# Patient Record
Sex: FEMALE | Race: WHITE | Employment: UNEMPLOYED | URBAN - METROPOLITAN AREA
[De-identification: names, ages, dates, MRNs, and addresses within clinical notes are randomized per-mention and may not be internally consistent; named-entity substitution may affect disease eponyms.]

---

## 2017-04-04 ENCOUNTER — OFFICE VISIT (OUTPATIENT)
Dept: DERMATOLOGY CLINIC | Facility: CLINIC | Age: 52
End: 2017-04-04

## 2017-04-04 DIAGNOSIS — L91.0 KELOID SCAR: ICD-10-CM

## 2017-04-04 DIAGNOSIS — Z85.828 PERSONAL HISTORY OF OTHER MALIGNANT NEOPLASM OF SKIN: Primary | ICD-10-CM

## 2017-04-04 DIAGNOSIS — D23.30 BENIGN NEOPLASM OF SKIN OF FACE: ICD-10-CM

## 2017-04-04 DIAGNOSIS — D23.4 BENIGN NEOPLASM OF SCALP AND SKIN OF NECK: ICD-10-CM

## 2017-04-04 DIAGNOSIS — L81.4 SOLAR LENTIGO: ICD-10-CM

## 2017-04-04 DIAGNOSIS — L57.0 ACTINIC KERATOSIS: ICD-10-CM

## 2017-04-04 DIAGNOSIS — Z86.018 HISTORY OF DYSPLASTIC NEVUS: ICD-10-CM

## 2017-04-04 DIAGNOSIS — D23.60 BENIGN NEOPLASM OF SKIN OF UPPER LIMB, INCLUDING SHOULDER, UNSPECIFIED LATERALITY: ICD-10-CM

## 2017-04-04 DIAGNOSIS — D23.5 BENIGN NEOPLASM OF SKIN OF TRUNK, EXCEPT SCROTUM: ICD-10-CM

## 2017-04-04 DIAGNOSIS — L57.8 SUN-DAMAGED SKIN: ICD-10-CM

## 2017-04-04 PROCEDURE — 99213 OFFICE O/P EST LOW 20 MIN: CPT | Performed by: DERMATOLOGY

## 2017-04-04 PROCEDURE — 17000 DESTRUCT PREMALG LESION: CPT | Performed by: DERMATOLOGY

## 2017-04-04 PROCEDURE — 17003 DESTRUCT PREMALG LES 2-14: CPT | Performed by: DERMATOLOGY

## 2017-04-04 RX ORDER — LOTEPREDNOL ETABONATE AND TOBRAMYCIN 5; 3 MG/ML; MG/ML
SUSPENSION/ DROPS OPHTHALMIC
Refills: 0 | COMMUNITY
Start: 2017-02-24 | End: 2017-10-19

## 2017-04-04 NOTE — PROGRESS NOTES
HPI:     Chief Complaint     Derm Problem        HPI     Derm Problem    Additional comments: lov 9/8/2016. Patient presents for upper body check. Personal hx of SCC and atypical nevus.         Last edited by TEMI Presley on 4/4/2017  2:54 PM. (Histor 2010    UPPER GI ENDOSCOPY,BIOPSY  2010    Comment EGD with biopsy       Social History   Marital Status:   Spouse Name: N/A    Years of Education: N/A  Number of Children: N/A     Occupational History  None on file     Social History Main Topics also keloid and spot of shave biopsy in the chest that did reveal a verrucoid irritated keratosis        ASSESSMENT/PLAN:   Actinic keratosis-3 lesions chest and arm are treated with cryotherapy.   Personal history of other malignant neoplasm of skin  (prim

## 2017-04-26 ENCOUNTER — OFFICE VISIT (OUTPATIENT)
Dept: INTERNAL MEDICINE CLINIC | Facility: CLINIC | Age: 52
End: 2017-04-26

## 2017-04-26 VITALS
WEIGHT: 185 LBS | BODY MASS INDEX: 29.38 KG/M2 | SYSTOLIC BLOOD PRESSURE: 101 MMHG | HEIGHT: 66.5 IN | HEART RATE: 66 BPM | TEMPERATURE: 98 F | DIASTOLIC BLOOD PRESSURE: 69 MMHG

## 2017-04-26 DIAGNOSIS — H04.301 TEAR DUCT INFECTION, RIGHT: Primary | ICD-10-CM

## 2017-04-26 PROCEDURE — 99213 OFFICE O/P EST LOW 20 MIN: CPT | Performed by: INTERNAL MEDICINE

## 2017-04-26 PROCEDURE — 99212 OFFICE O/P EST SF 10 MIN: CPT | Performed by: INTERNAL MEDICINE

## 2017-04-26 NOTE — PROGRESS NOTES
Eye pain x 5 weeks  Saw optometry given a dop no improvement  Eye is red in the end of the day pt is applying Visine    Blood pressure 101/69, pulse 66, temperature 98.1 °F (36.7 °C), temperature source Oral, height 5' 6.5\" (1.689 m), weight 185 lb (83.91

## 2017-10-19 ENCOUNTER — OFFICE VISIT (OUTPATIENT)
Dept: INTERNAL MEDICINE CLINIC | Facility: CLINIC | Age: 52
End: 2017-10-19

## 2017-10-19 ENCOUNTER — APPOINTMENT (OUTPATIENT)
Dept: LAB | Age: 52
End: 2017-10-19
Attending: INTERNAL MEDICINE
Payer: COMMERCIAL

## 2017-10-19 VITALS
HEART RATE: 64 BPM | DIASTOLIC BLOOD PRESSURE: 74 MMHG | TEMPERATURE: 98 F | HEIGHT: 66.5 IN | WEIGHT: 184 LBS | SYSTOLIC BLOOD PRESSURE: 108 MMHG | BODY MASS INDEX: 29.22 KG/M2

## 2017-10-19 DIAGNOSIS — Z12.4 SCREENING FOR CERVICAL CANCER: ICD-10-CM

## 2017-10-19 DIAGNOSIS — Z00.00 ROUTINE GENERAL MEDICAL EXAMINATION AT A HEALTH CARE FACILITY: ICD-10-CM

## 2017-10-19 DIAGNOSIS — Z12.31 VISIT FOR SCREENING MAMMOGRAM: ICD-10-CM

## 2017-10-19 DIAGNOSIS — Z00.00 ROUTINE GENERAL MEDICAL EXAMINATION AT A HEALTH CARE FACILITY: Primary | ICD-10-CM

## 2017-10-19 PROCEDURE — 85027 COMPLETE CBC AUTOMATED: CPT

## 2017-10-19 PROCEDURE — 80053 COMPREHEN METABOLIC PANEL: CPT

## 2017-10-19 PROCEDURE — 84443 ASSAY THYROID STIM HORMONE: CPT

## 2017-10-19 PROCEDURE — 93010 ELECTROCARDIOGRAM REPORT: CPT | Performed by: INTERNAL MEDICINE

## 2017-10-19 PROCEDURE — 36415 COLL VENOUS BLD VENIPUNCTURE: CPT

## 2017-10-19 PROCEDURE — 93005 ELECTROCARDIOGRAM TRACING: CPT

## 2017-10-19 PROCEDURE — 80061 LIPID PANEL: CPT

## 2017-10-19 PROCEDURE — 81015 MICROSCOPIC EXAM OF URINE: CPT

## 2017-10-19 PROCEDURE — 99396 PREV VISIT EST AGE 40-64: CPT | Performed by: INTERNAL MEDICINE

## 2017-10-19 PROCEDURE — 84439 ASSAY OF FREE THYROXINE: CPT

## 2017-10-27 NOTE — PROGRESS NOTES
HPI:    Patient ID: Cornelia Ambrosio is a 46year old female. HPI    Review of Systems   Constitutional: Negative for activity change, chills, fatigue and fever.    HENT: Negative for ear discharge, nosebleeds, postnasal drip, rhinorrhea, sinus pressure and Relation Age of Onset   • Breast Cancer Mother    • Colon Cancer Maternal Grandmother      Rectal cancer   • Stroke Maternal Grandfather      CVA   • Hypertension Paternal Grandmother    • Neurological Disorder Paternal Grandmother      Alzheimer's disease Neurological: She is alert. Skin: No rash noted. She is not diaphoretic. No erythema. Nursing note and vitals reviewed.            ASSESSMENT/PLAN:     Orders Placed This Encounter      Hpv Dna  High Risk , Thin Prep Collect      Assay, Thyroid Stim H

## 2017-12-07 ENCOUNTER — OFFICE VISIT (OUTPATIENT)
Dept: DERMATOLOGY CLINIC | Facility: CLINIC | Age: 52
End: 2017-12-07

## 2017-12-07 DIAGNOSIS — D23.70 BENIGN NEOPLASM OF SKIN OF LOWER LIMB, INCLUDING HIP, UNSPECIFIED LATERALITY: ICD-10-CM

## 2017-12-07 DIAGNOSIS — Z86.018 HISTORY OF DYSPLASTIC NEVUS: ICD-10-CM

## 2017-12-07 DIAGNOSIS — D23.60 BENIGN NEOPLASM OF SKIN OF UPPER LIMB, INCLUDING SHOULDER, UNSPECIFIED LATERALITY: ICD-10-CM

## 2017-12-07 DIAGNOSIS — D23.30 BENIGN NEOPLASM OF SKIN OF FACE: ICD-10-CM

## 2017-12-07 DIAGNOSIS — D23.5 BENIGN NEOPLASM OF SKIN OF TRUNK, EXCEPT SCROTUM: ICD-10-CM

## 2017-12-07 DIAGNOSIS — L57.8 SUN-DAMAGED SKIN: ICD-10-CM

## 2017-12-07 DIAGNOSIS — D48.5 NEOPLASM OF UNCERTAIN BEHAVIOR OF SKIN: ICD-10-CM

## 2017-12-07 DIAGNOSIS — L91.0 KELOID SCAR: ICD-10-CM

## 2017-12-07 DIAGNOSIS — Z85.828 PERSONAL HISTORY OF OTHER MALIGNANT NEOPLASM OF SKIN: Primary | ICD-10-CM

## 2017-12-07 DIAGNOSIS — D23.4 BENIGN NEOPLASM OF SCALP AND SKIN OF NECK: ICD-10-CM

## 2017-12-07 PROCEDURE — 99212 OFFICE O/P EST SF 10 MIN: CPT | Performed by: DERMATOLOGY

## 2017-12-07 PROCEDURE — 99213 OFFICE O/P EST LOW 20 MIN: CPT | Performed by: DERMATOLOGY

## 2017-12-07 RX ORDER — A/SINGAPORE/GP1908/2015 IVR-180 (H1N1) (AN A/MICHIGAN/45/2015-LIKE VIRUS), A/SINGAPORE/GP2050/2015 (H3N2) (AN A/HONG KONG/4801/2014 - LIKE VIRUS), B/UTAH/9/2014 (A B/PHUKET/3073/2013-LIKE VIRUS), B/HONG KONG/259/2010 (A B/BRISBANE/60/08-LIKE VIRUS) 15; 15; 15; 15 UG/.5ML; UG/.5ML; UG/.5ML; UG/.5ML
INJECTION, SUSPENSION INTRAMUSCULAR
Refills: 0 | COMMUNITY
Start: 2017-09-21 | End: 2018-05-29

## 2017-12-07 NOTE — PROGRESS NOTES
Past Medical History:   Diagnosis Date   • Anemia, iron deficiency    • Atypical nevus 2001    skin of left medial ankle   • Gastritis    • Hx of hand surgery     Right hand surgery   • MGD (meibomian gland dysfunction) 2009    OU   • Myopia of both eyes w

## 2017-12-07 NOTE — PROGRESS NOTES
HPI:     Chief Complaint     Skin Cancer        HPI     Skin Cancer    Additional comments: Pt presents for 8 month f/u due to hx of SCC and atypical nevus.  Pt presents today with concern of multiple lesions throughout body and requests a full body skin ev (meibomian gland dysfunction) 2009    OU   • Myopia of both eyes with astigmatism 2009    OU   • Ocular migraine 2009    OS   • SCCA (squamous cell carcinoma) of skin 2015 2015   • Seasonal allergies    • Squamous carcinoma 2015    left forearm, invasiv uniform in color, shape and borders.   There are very numerous in number  -there are very diffuse scattered blotchy brown macules on face, trunk and extremities  - there is a keloid/hypertrophic scar on right chest  - there are some cherry red papules  - th

## 2017-12-11 ENCOUNTER — HOSPITAL ENCOUNTER (OUTPATIENT)
Dept: MAMMOGRAPHY | Facility: HOSPITAL | Age: 52
Discharge: HOME | End: 2017-12-11
Attending: INTERNAL MEDICINE
Payer: COMMERCIAL

## 2017-12-11 DIAGNOSIS — Z00.00 ROUTINE GENERAL MEDICAL EXAMINATION AT A HEALTH CARE FACILITY: ICD-10-CM

## 2017-12-11 DIAGNOSIS — Z12.31 VISIT FOR SCREENING MAMMOGRAM: ICD-10-CM

## 2017-12-11 PROCEDURE — 77067 SCR MAMMO BI INCL CAD: CPT | Performed by: INTERNAL MEDICINE

## 2018-01-16 ENCOUNTER — TELEPHONE (OUTPATIENT)
Dept: GASTROENTEROLOGY | Facility: CLINIC | Age: 53
End: 2018-01-16

## 2018-01-16 ENCOUNTER — OFFICE VISIT (OUTPATIENT)
Dept: GASTROENTEROLOGY | Facility: CLINIC | Age: 53
End: 2018-01-16

## 2018-01-16 VITALS
SYSTOLIC BLOOD PRESSURE: 115 MMHG | DIASTOLIC BLOOD PRESSURE: 72 MMHG | BODY MASS INDEX: 29.6 KG/M2 | HEART RATE: 67 BPM | HEIGHT: 66.5 IN | WEIGHT: 186.38 LBS

## 2018-01-16 DIAGNOSIS — Z80.0 FAMILY HISTORY OF COLON CANCER: ICD-10-CM

## 2018-01-16 DIAGNOSIS — Z12.11 COLON CANCER SCREENING: Primary | ICD-10-CM

## 2018-01-16 DIAGNOSIS — Z83.71 FAMILY HISTORY OF COLONIC POLYPS: ICD-10-CM

## 2018-01-16 DIAGNOSIS — Z12.12 ENCOUNTER FOR COLORECTAL CANCER SCREENING: Primary | ICD-10-CM

## 2018-01-16 DIAGNOSIS — Z12.11 ENCOUNTER FOR COLORECTAL CANCER SCREENING: Primary | ICD-10-CM

## 2018-01-16 DIAGNOSIS — Z01.818 PREOPERATIVE CLEARANCE: ICD-10-CM

## 2018-01-16 PROBLEM — Z83.719 FAMILY HISTORY OF COLONIC POLYPS: Status: ACTIVE | Noted: 2018-01-16

## 2018-01-16 PROCEDURE — 99243 OFF/OP CNSLTJ NEW/EST LOW 30: CPT | Performed by: INTERNAL MEDICINE

## 2018-01-16 NOTE — H&P
History of present illness: This is a very pleasant 14-year-old female patient of Dr. Rosalinda Murillo who is referred for colorectal cancer screening.   The patient last underwent colonoscopy about 12 years ago, per myself and states she had nothing but hemorrho

## 2018-01-16 NOTE — TELEPHONE ENCOUNTER
Scheduled for:  Colonoscopy - 04381  Provider Name:  Dr. Richard Núñez  Date:  1/30/18  Location:  [facility]  Sedation:  MAC  Time:  1:00 pm (pt is aware to arrive at 12:00 pm)  Prep:  Miralax/Gatorade, Prep instructions were given to pt in the office, pt verbalize

## 2018-01-16 NOTE — PROGRESS NOTES
HPI:    Patient ID: Karen Jaime is a 46year old female. HPI    Review of Systems   Constitutional: Negative for activity change, appetite change, chills, diaphoresis, fatigue, fever and unexpected weight change.    HENT: Negative for congestion, ear p and time. She appears well-developed and well-nourished. No distress. HENT:   Head: Normocephalic and atraumatic. Mouth/Throat: Oropharynx is clear and moist. No oropharyngeal exudate.    Eyes: Conjunctivae and EOM are normal. Pupils are equal, round, a

## 2018-01-16 NOTE — PATIENT INSTRUCTIONS
1. Schedule colonoscopy with split dose miralax preparation with MAC at [facility]

## 2018-01-17 ENCOUNTER — TELEPHONE (OUTPATIENT)
Dept: GASTROENTEROLOGY | Facility: CLINIC | Age: 53
End: 2018-01-17

## 2018-01-17 NOTE — TELEPHONE ENCOUNTER
Pt states per Collins's the office would need to contact them with diagnosis codes and location for CLN 1/30/18.  Please call insurance company [phone] option 3

## 2018-01-17 NOTE — TELEPHONE ENCOUNTER
I spoke to Cherrie at [facility]. Call ref # [id number]. The PA still has to be approved for the Colonoscopy scheduled for 01/30/18. She states it can take up to 15 days for approval. The Authorization Ref # is [id number].  [facility] will contact us if they need any further

## 2018-01-23 NOTE — TELEPHONE ENCOUNTER
I spoke to Janusz Gardiner.,  at [facility]. She states the procedure has been approved . The authorization ref # is the same as the approval #. No answer on pt's phone.  It did not go to voice mail

## 2018-01-26 NOTE — TELEPHONE ENCOUNTER
Spoke to the pt. She was notified the procedure was approved. She states she also got a letter in the mail.

## 2018-01-30 ENCOUNTER — HOSPITAL ENCOUNTER (OUTPATIENT)
Age: 53
Setting detail: HOSPITAL OUTPATIENT SURGERY
Discharge: HOME | End: 2018-01-30
Attending: INTERNAL MEDICINE | Admitting: INTERNAL MEDICINE
Payer: COMMERCIAL

## 2018-01-30 ENCOUNTER — ANESTHESIA (OUTPATIENT)
Dept: ENDOSCOPY | Age: 53
End: 2018-01-30
Payer: COMMERCIAL

## 2018-01-30 ENCOUNTER — ANESTHESIA EVENT (OUTPATIENT)
Dept: ENDOSCOPY | Age: 53
End: 2018-01-30
Payer: COMMERCIAL

## 2018-01-30 ENCOUNTER — SURGERY (OUTPATIENT)
Age: 53
End: 2018-01-30

## 2018-01-30 VITALS
OXYGEN SATURATION: 100 % | HEIGHT: 66 IN | HEART RATE: 76 BPM | SYSTOLIC BLOOD PRESSURE: 117 MMHG | WEIGHT: 180 LBS | RESPIRATION RATE: 16 BRPM | DIASTOLIC BLOOD PRESSURE: 82 MMHG | BODY MASS INDEX: 28.93 KG/M2

## 2018-01-30 DIAGNOSIS — Z80.0 FAMILY HISTORY OF COLON CANCER: ICD-10-CM

## 2018-01-30 DIAGNOSIS — Z12.11 COLON CANCER SCREENING: ICD-10-CM

## 2018-01-30 PROBLEM — K64.8 INTERNAL HEMORRHOIDS: Status: ACTIVE | Noted: 2018-01-30

## 2018-01-30 PROBLEM — Z98.890 S/P COLONOSCOPIC POLYPECTOMY: Status: ACTIVE | Noted: 2018-01-30

## 2018-01-30 PROCEDURE — 45385 COLONOSCOPY W/LESION REMOVAL: CPT | Performed by: INTERNAL MEDICINE

## 2018-01-30 RX ORDER — LIDOCAINE HYDROCHLORIDE 10 MG/ML
INJECTION, SOLUTION EPIDURAL; INFILTRATION; INTRACAUDAL; PERINEURAL AS NEEDED
Status: DISCONTINUED | OUTPATIENT
Start: 2018-01-30 | End: 2018-01-30 | Stop reason: SURG

## 2018-01-30 RX ORDER — NALOXONE HYDROCHLORIDE 0.4 MG/ML
80 INJECTION, SOLUTION INTRAMUSCULAR; INTRAVENOUS; SUBCUTANEOUS AS NEEDED
Status: CANCELLED | OUTPATIENT
Start: 2018-01-30 | End: 2018-01-30

## 2018-01-30 RX ORDER — SODIUM CHLORIDE, SODIUM LACTATE, POTASSIUM CHLORIDE, CALCIUM CHLORIDE 600; 310; 30; 20 MG/100ML; MG/100ML; MG/100ML; MG/100ML
INJECTION, SOLUTION INTRAVENOUS CONTINUOUS
Status: CANCELLED | OUTPATIENT
Start: 2018-01-30

## 2018-01-30 RX ORDER — ACETAMINOPHEN 325 MG/1
325 TABLET ORAL EVERY 6 HOURS PRN
COMMUNITY
End: 2019-10-07 | Stop reason: ALTCHOICE

## 2018-01-30 RX ORDER — SODIUM CHLORIDE, SODIUM LACTATE, POTASSIUM CHLORIDE, CALCIUM CHLORIDE 600; 310; 30; 20 MG/100ML; MG/100ML; MG/100ML; MG/100ML
INJECTION, SOLUTION INTRAVENOUS CONTINUOUS PRN
Status: DISCONTINUED | OUTPATIENT
Start: 2018-01-30 | End: 2018-01-30 | Stop reason: SURG

## 2018-01-30 RX ADMIN — SODIUM CHLORIDE, SODIUM LACTATE, POTASSIUM CHLORIDE, CALCIUM CHLORIDE: 600; 310; 30; 20 INJECTION, SOLUTION INTRAVENOUS at 13:19:00

## 2018-01-30 RX ADMIN — LIDOCAINE HYDROCHLORIDE 25 MG: 10 INJECTION, SOLUTION EPIDURAL; INFILTRATION; INTRACAUDAL; PERINEURAL at 13:20:00

## 2018-01-30 RX ADMIN — SODIUM CHLORIDE, SODIUM LACTATE, POTASSIUM CHLORIDE, CALCIUM CHLORIDE: 600; 310; 30; 20 INJECTION, SOLUTION INTRAVENOUS at 13:37:00

## 2018-01-30 RX ADMIN — SODIUM CHLORIDE, SODIUM LACTATE, POTASSIUM CHLORIDE, CALCIUM CHLORIDE: 600; 310; 30; 20 INJECTION, SOLUTION INTRAVENOUS at 13:21:00

## 2018-01-30 NOTE — ANESTHESIA POSTPROCEDURE EVALUATION
Patient: Cornelia Ambrosio    Procedure Summary     Date:  01/30/18 Room / Location:  [facility] ENDOSCOPY 01 / [facility] ENDO    Anesthesia Start:  4187 Anesthesia Stop:      Procedure:  COLONOSCOPY (N/A ) Diagnosis:       Family history of colon cancer      Colon can

## 2018-01-30 NOTE — ANESTHESIA PREPROCEDURE EVALUATION
Anesthesia PreOp Note    HPI:     Karen Jaime is a 46year old female who presents for preoperative consultation requested by: Shirley Ferrera MD    Date of Surgery: 1/30/2018    Procedure(s):  COLONOSCOPY  Indication: Family history of colon Disp:  Rfl:  1/28/2018   KRILL OIL OR Take by mouth daily. Disp:  Rfl:  1/29/2018   Fexofenadine HCl (ALLEGRA) 180 MG Oral Tab Take  by mouth.  Disp:  Rfl:  1/25/2018   ibuprofen (MOTRIN) 800 MG Oral Tab  Disp:  Rfl:  1/27/2018   FLUCELVAX QUADRIVALENT 0.5 lb)   Height: 1.676 m (5' 6\")        Anesthesia ROS/Med Hx and Physical Exam     Patient summary reviewed and Nursing notes reviewed    No history of anesthetic complications   Airway   Mallampati: II  Neck ROM: full  Dental - normal exam     Pulmonary -

## 2018-01-30 NOTE — H&P
History & Physical Examination    Patient Name: Lashawn Gomez  MRN: [id number]  [facility]: [id number]  YOB: 1965    Diagnosis: Colorectal screening, family history of colon polyps      Prescriptions Prior to Admission:  acetaminophen 325 MG Oral Ta Family H/o   • possible hx of skin cancer [OTHER] Brother         Smoking status: Never Smoker    Smokeless tobacco: Never Used    Alcohol use Yes    Comment: Weekly Wine 5 drinks;        SYSTEM Check if Physical Exam is Normal If not normal, please explai

## 2018-01-30 NOTE — BRIEF OP NOTE
Pre-Operative Diagnosis: Colon cancer screening , Family history colon polyps     Post-Operative Diagnosis: Status post polypectomy ×2, internal hemorrhoids     Procedure Performed:   Procedure(s):  COLONOSCOPY with polypectomy ×2    Surgeon(s) and Role

## 2018-01-31 NOTE — OPERATIVE REPORT
[facility]    PATIENT'S NAME: Aniyah Comer   ATTENDING PHYSICIAN: Sylvester Merino MD   OPERATING PHYSICIAN: Sylvester Merino MD   PATIENT ACCOUNT#:   [id number]    LOCATION:  [address] ENDO POOL ROOMS 1 [facility] procedure well without immediate complications. IMPRESSION:    1. Status post polypectomy x2. Rule out colon adenomas. 2.   Internal hemorrhoids. RECOMMENDATIONS:  Check pathology results to determine interval for next colonoscopy.     Dictated By

## 2018-02-01 ENCOUNTER — TELEPHONE (OUTPATIENT)
Dept: GASTROENTEROLOGY | Facility: CLINIC | Age: 53
End: 2018-02-01

## 2018-02-01 NOTE — TELEPHONE ENCOUNTER
Entered into EPIC:Recall colon in 5 years per Dr. Harris Paz. Last Colon done 1/30/18, next due 1/30/23. Snapshot updated. Letter mailed to pt home address today.

## 2018-05-25 ENCOUNTER — NURSE TRIAGE (OUTPATIENT)
Dept: OTHER | Age: 53
End: 2018-05-25

## 2018-05-25 NOTE — TELEPHONE ENCOUNTER
Action Requested: Summary for Provider     []  Critical Lab, Recommendations Needed  [] Need Additional Advice  []   FYI    []   Need Orders  [] Need Medications Sent to Pharmacy  []  Other     SUMMARY: Pt was scheduled for OV on 5/29/18.     Pt called in f

## 2018-05-29 ENCOUNTER — HOSPITAL ENCOUNTER (OUTPATIENT)
Dept: ULTRASOUND IMAGING | Facility: HOSPITAL | Age: 53
Discharge: HOME | End: 2018-05-29
Attending: INTERNAL MEDICINE
Payer: COMMERCIAL

## 2018-05-29 ENCOUNTER — OFFICE VISIT (OUTPATIENT)
Dept: INTERNAL MEDICINE CLINIC | Facility: CLINIC | Age: 53
End: 2018-05-29

## 2018-05-29 ENCOUNTER — LAB ENCOUNTER (OUTPATIENT)
Dept: LAB | Age: 53
End: 2018-05-29
Attending: INTERNAL MEDICINE
Payer: COMMERCIAL

## 2018-05-29 VITALS
WEIGHT: 183 LBS | BODY MASS INDEX: 29.06 KG/M2 | HEART RATE: 72 BPM | DIASTOLIC BLOOD PRESSURE: 76 MMHG | HEIGHT: 66.5 IN | SYSTOLIC BLOOD PRESSURE: 116 MMHG | TEMPERATURE: 98 F

## 2018-05-29 DIAGNOSIS — R10.11 RUQ PAIN: Primary | ICD-10-CM

## 2018-05-29 DIAGNOSIS — R10.11 RUQ PAIN: ICD-10-CM

## 2018-05-29 PROCEDURE — 81001 URINALYSIS AUTO W/SCOPE: CPT

## 2018-05-29 PROCEDURE — 80076 HEPATIC FUNCTION PANEL: CPT

## 2018-05-29 PROCEDURE — 99214 OFFICE O/P EST MOD 30 MIN: CPT | Performed by: INTERNAL MEDICINE

## 2018-05-29 PROCEDURE — 36415 COLL VENOUS BLD VENIPUNCTURE: CPT

## 2018-05-29 PROCEDURE — 83690 ASSAY OF LIPASE: CPT

## 2018-05-29 PROCEDURE — 85025 COMPLETE CBC W/AUTO DIFF WBC: CPT

## 2018-05-29 PROCEDURE — 99212 OFFICE O/P EST SF 10 MIN: CPT | Performed by: INTERNAL MEDICINE

## 2018-05-29 PROCEDURE — 76705 ECHO EXAM OF ABDOMEN: CPT | Performed by: INTERNAL MEDICINE

## 2018-05-29 PROCEDURE — 82150 ASSAY OF AMYLASE: CPT

## 2018-05-29 PROCEDURE — 80048 BASIC METABOLIC PNL TOTAL CA: CPT

## 2018-05-29 NOTE — PROGRESS NOTES
HPI:    Patient ID: Joseline Israel is a 46year old female.     HPI    RUQ pain  2 wks ago  Went to [place]  Fine all week Thurs fllyin back happened again  Had left over cake  Ate a lot of cake  Going to [place] tomorrow      Review of Systems   Constitutio URINE-COLOR      Yellow Yellow   CLARITY URINE      Clear Clear   SPECIFIC GRAVITY      1.002 - 1.035 1.019   PH, URINE      5.0 - 8.0 5.0   PROTEIN (URINE DIPSTICK)      Negative mg/dL Negative   GLUCOSE (URINE DIPSTICK)      Negative mg/dL Negative   K Small cyst in the left lobe measuring 2.38 x 1.92 x 2.97 cm. Normal size, echotexture and color flow. No masses or duct dilatation. GALLBLADDER:            Normal.  Normal size. No calculi, wall thickening or pericholecystic fluid.   [facility] [place] Surgeon: Sharlene Kirby MD;  Location: [facility]  No date: TONSILLECTOMY  2010: UPPER GI ENDOSCOPY,BIOPSY      Comment: EGD with biopsy   Family History   Problem Relation Age of Onset   • Breast Cancer Mother    • Colon Cancer noted. She is not diaphoretic. No erythema. Nursing note and vitals reviewed.            ASSESSMENT/PLAN:   (R10.11) RUQ pain  (primary encounter diagnosis)  Plan: AMYLASE, LIPASE, BASIC METABOLIC PANEL (8), CBC        WITH DIFFERENTIAL WITH PLATELET, HEP

## 2018-08-01 ENCOUNTER — MED REC SCAN ONLY (OUTPATIENT)
Dept: INTERNAL MEDICINE CLINIC | Facility: CLINIC | Age: 53
End: 2018-08-01

## 2018-10-01 ENCOUNTER — OFFICE VISIT (OUTPATIENT)
Dept: DERMATOLOGY CLINIC | Facility: CLINIC | Age: 53
End: 2018-10-01
Payer: COMMERCIAL

## 2018-10-01 DIAGNOSIS — D23.60 BENIGN NEOPLASM OF SKIN OF UPPER LIMB, INCLUDING SHOULDER, UNSPECIFIED LATERALITY: ICD-10-CM

## 2018-10-01 DIAGNOSIS — D23.5 BENIGN NEOPLASM OF SKIN OF TRUNK, EXCEPT SCROTUM: ICD-10-CM

## 2018-10-01 DIAGNOSIS — L81.4 SOLAR LENTIGO: ICD-10-CM

## 2018-10-01 DIAGNOSIS — D23.30 BENIGN NEOPLASM OF SKIN OF FACE: ICD-10-CM

## 2018-10-01 DIAGNOSIS — D23.4 BENIGN NEOPLASM OF SCALP AND SKIN OF NECK: ICD-10-CM

## 2018-10-01 DIAGNOSIS — D23.70 BENIGN NEOPLASM OF SKIN OF LOWER LIMB, INCLUDING HIP, UNSPECIFIED LATERALITY: ICD-10-CM

## 2018-10-01 DIAGNOSIS — L57.8 SUN-DAMAGED SKIN: ICD-10-CM

## 2018-10-01 DIAGNOSIS — Z86.018 HISTORY OF DYSPLASTIC NEVUS: ICD-10-CM

## 2018-10-01 DIAGNOSIS — L91.0 KELOID SCAR: ICD-10-CM

## 2018-10-01 DIAGNOSIS — Z85.828 PERSONAL HISTORY OF SKIN CANCER: Primary | ICD-10-CM

## 2018-10-01 PROCEDURE — 99212 OFFICE O/P EST SF 10 MIN: CPT | Performed by: DERMATOLOGY

## 2018-10-01 PROCEDURE — 99213 OFFICE O/P EST LOW 20 MIN: CPT | Performed by: DERMATOLOGY

## 2018-10-01 NOTE — PROGRESS NOTES
HPI:     Chief Complaint     Full Skin Exam        HPI     Full Skin Exam      Additional comments: LOV 12/07/17 pt seen for multiple lesions here for full body check has one spot of concern under her left breast that she feels her bra rub up against pt ha Disp:  Rfl:    ibuprofen (MOTRIN) 800 MG Oral Tab  Disp:  Rfl:    NASONEX 50 MCG/ACT Nasal Suspension  Disp:  Rfl:      Allergies:     Depauw                  HIVES    Past Medical History:   Diagnosis Date   • Anemia, iron deficiency    • Atypical nevus 2 Transfusions: Not Asked        Caffeine Concern: Yes          Coffee, 3 cups daily;          Occupational Exposure: Not Asked        Hobby Hazards: Not Asked        Sleep Concern: Not Asked        Stress Concern: Not Asked        Weight Concern: Not Asked there are numerous brown stuck on keratoses. ASSESSMENT/PLAN:   Personal history of skin cancer  (primary encounter diagnosis)-no recurrences–patient will follow-up in 6 months for upper body check.   Will come sooner if any new issues  Sun-damaged s

## 2019-03-23 ENCOUNTER — APPOINTMENT (OUTPATIENT)
Dept: CT IMAGING | Facility: HOSPITAL | Age: 54
End: 2019-03-23
Attending: NURSE PRACTITIONER
Payer: COMMERCIAL

## 2019-03-23 ENCOUNTER — APPOINTMENT (OUTPATIENT)
Dept: GENERAL RADIOLOGY | Facility: HOSPITAL | Age: 54
End: 2019-03-23
Attending: NURSE PRACTITIONER
Payer: COMMERCIAL

## 2019-03-23 ENCOUNTER — HOSPITAL ENCOUNTER (EMERGENCY)
Facility: HOSPITAL | Age: 54
Discharge: HOME | End: 2019-03-23
Payer: COMMERCIAL

## 2019-03-23 VITALS
HEIGHT: 66 IN | WEIGHT: 180 LBS | TEMPERATURE: 98 F | DIASTOLIC BLOOD PRESSURE: 85 MMHG | BODY MASS INDEX: 28.93 KG/M2 | RESPIRATION RATE: 20 BRPM | OXYGEN SATURATION: 98 % | HEART RATE: 67 BPM | SYSTOLIC BLOOD PRESSURE: 136 MMHG

## 2019-03-23 DIAGNOSIS — S05.11XA PERIORBITAL CONTUSION OF RIGHT EYE, INITIAL ENCOUNTER: Primary | ICD-10-CM

## 2019-03-23 DIAGNOSIS — S42.401A CLOSED FRACTURE OF RIGHT ELBOW, INITIAL ENCOUNTER: ICD-10-CM

## 2019-03-23 PROCEDURE — 73080 X-RAY EXAM OF ELBOW: CPT | Performed by: NURSE PRACTITIONER

## 2019-03-23 PROCEDURE — 99284 EMERGENCY DEPT VISIT MOD MDM: CPT

## 2019-03-23 PROCEDURE — 29105 APPLICATION LONG ARM SPLINT: CPT

## 2019-03-23 PROCEDURE — 70480 CT ORBIT/EAR/FOSSA W/O DYE: CPT | Performed by: NURSE PRACTITIONER

## 2019-03-23 RX ORDER — HYDROCODONE BITARTRATE AND ACETAMINOPHEN 5; 325 MG/1; MG/1
1 TABLET ORAL ONCE
Status: COMPLETED | OUTPATIENT
Start: 2019-03-23 | End: 2019-03-23

## 2019-03-23 RX ORDER — HYDROCODONE BITARTRATE AND ACETAMINOPHEN 5; 325 MG/1; MG/1
1-2 TABLET ORAL EVERY 4 HOURS PRN
Qty: 14 TABLET | Refills: 0 | Status: SHIPPED | OUTPATIENT
Start: 2019-03-23 | End: 2019-03-30

## 2019-03-23 NOTE — ED NOTES
Pt presents fall today. Pt was walking her two dogs when the dog crossed in front of her and she tripped, falling on to right arm and face. No LOC, no blood thinners.

## 2019-03-23 NOTE — ED PROVIDER NOTES
Patient Seen in: [facility] AND [facility] Emergency Department    History   Patient presents with:  Fall (musculoskeletal, neurologic)    Stated Complaint:     HPI    70-year-old female presents to the emergency department after tripping and falling just prior src Temporal   SpO2 96 %   O2 Device None (Room air)       Current:/85   Pulse 72   Temp 97.6 °F (36.4 °C) (Temporal)   Resp 18   Ht 167.6 cm (5' 6\")   Wt 81.6 kg   SpO2 97%   BMI 29.05 kg/m²         Physical Exam   Constitutional: She is oriented t follow up, and return to the ER precautions. I explained to the patient that emergent conditions may arise and to return to the ER for new, worsening or any persistent conditions.  I've explained the importance of following up with their doctor as instructe

## 2019-03-23 NOTE — ED INITIAL ASSESSMENT (HPI)
PT WAS WALKING DOGS AND FELL ON CURB, BRUISING TO RIGHT EYE, AND PAIN TO RIGHT ELBOW. DENIES LOC. 10/10 PAIN WITH MOVEMENT.

## 2019-04-01 ENCOUNTER — OFFICE VISIT (OUTPATIENT)
Dept: DERMATOLOGY CLINIC | Facility: CLINIC | Age: 54
End: 2019-04-01
Payer: COMMERCIAL

## 2019-04-01 DIAGNOSIS — L57.0 ACTINIC KERATOSIS: Primary | ICD-10-CM

## 2019-04-01 DIAGNOSIS — D23.60 BENIGN NEOPLASM OF SKIN OF UPPER LIMB, INCLUDING SHOULDER, UNSPECIFIED LATERALITY: ICD-10-CM

## 2019-04-01 DIAGNOSIS — Z86.018 HISTORY OF DYSPLASTIC NEVUS: ICD-10-CM

## 2019-04-01 DIAGNOSIS — L57.8 SUN-DAMAGED SKIN: ICD-10-CM

## 2019-04-01 DIAGNOSIS — D23.5 BENIGN NEOPLASM OF SKIN OF TRUNK, EXCEPT SCROTUM: ICD-10-CM

## 2019-04-01 DIAGNOSIS — Z85.828 PERSONAL HISTORY OF SKIN CANCER: ICD-10-CM

## 2019-04-01 DIAGNOSIS — D23.30 BENIGN NEOPLASM OF SKIN OF FACE: ICD-10-CM

## 2019-04-01 DIAGNOSIS — D23.4 BENIGN NEOPLASM OF SCALP AND SKIN OF NECK: ICD-10-CM

## 2019-04-01 PROCEDURE — 17000 DESTRUCT PREMALG LESION: CPT | Performed by: DERMATOLOGY

## 2019-04-01 PROCEDURE — 99213 OFFICE O/P EST LOW 20 MIN: CPT | Performed by: DERMATOLOGY

## 2019-04-01 NOTE — PROGRESS NOTES
HPI:     Chief Complaint     Upper Body Exam        HPI     Upper Body Exam      Additional comments: LOV 10/1/2018 Patient present for 6 month upper body skin exam . Patient has hx of Atypical nevus and SCC          Last edited by Sharma Brittle, [address] [place] [address] Telma on gland dysfunction) 2009    OU   • Myopia of both eyes with astigmatism 2009    OU   • Ocular migraine 2009    OS   • S/P colonoscopic polypectomy 1/30/2018   • SCCA (squamous cell carcinoma) of skin 2015 2015   • Seasonal allergies    • Squamous carcino on file    Other Topics      Concerns:         Service: Not Asked        Blood Transfusions: Not Asked        Caffeine Concern: Yes          Coffee, 3 cups daily;          Occupational Exposure: Not Asked        Hobby Hazards: Not Asked        Sleep injection. - there are some cherry red papule  - there are several brown stuck on keratotic lesions. ASSESSMENT/PLAN:   Actinic keratosis  (primary encounter diagnosis)-diagnosis discussed–lesion on chest is treated with cryotherapy.   Post-cryo dir

## 2019-04-01 NOTE — PROGRESS NOTES
Past Medical History:   Diagnosis Date   • Anemia, iron deficiency    • Atypical nevus 2001    skin of left medial ankle   • Gastritis    • Hx of hand surgery     Right hand surgery   • Internal hemorrhoids 1/30/2018   • MGD (meibomian gland dysfunction) 2 file        Relationship status: Not on file      Intimate partner violence:        Fear of current or ex partner: Not on file        Emotionally abused: Not on file        Physically abused: Not on file        Forced sexual activity: Not on file    Other

## 2019-05-23 PROBLEM — S52.126D CLOSED NONDISPLACED FRACTURE OF HEAD OF RADIUS WITH ROUTINE HEALING: Status: ACTIVE | Noted: 2019-05-23

## 2019-10-07 ENCOUNTER — OFFICE VISIT (OUTPATIENT)
Dept: DERMATOLOGY CLINIC | Facility: CLINIC | Age: 54
End: 2019-10-07
Payer: COMMERCIAL

## 2019-10-07 DIAGNOSIS — D23.30 BENIGN NEOPLASM OF SKIN OF FACE: ICD-10-CM

## 2019-10-07 DIAGNOSIS — Z86.018 HISTORY OF DYSPLASTIC NEVUS: ICD-10-CM

## 2019-10-07 DIAGNOSIS — D23.60 BENIGN NEOPLASM OF SKIN OF UPPER LIMB, INCLUDING SHOULDER, UNSPECIFIED LATERALITY: ICD-10-CM

## 2019-10-07 DIAGNOSIS — D23.5 BENIGN NEOPLASM OF SKIN OF TRUNK, EXCEPT SCROTUM: ICD-10-CM

## 2019-10-07 DIAGNOSIS — L57.0 ACTINIC KERATOSIS: ICD-10-CM

## 2019-10-07 DIAGNOSIS — Z85.828 PERSONAL HISTORY OF SKIN CANCER: ICD-10-CM

## 2019-10-07 DIAGNOSIS — L82.0 INFLAMED SEBORRHEIC KERATOSIS: ICD-10-CM

## 2019-10-07 DIAGNOSIS — L82.1 SEBORRHEIC KERATOSES: ICD-10-CM

## 2019-10-07 DIAGNOSIS — D48.5 NEOPLASM OF UNCERTAIN BEHAVIOR OF SKIN: Primary | ICD-10-CM

## 2019-10-07 DIAGNOSIS — D23.4 BENIGN NEOPLASM OF SCALP AND SKIN OF NECK: ICD-10-CM

## 2019-10-07 DIAGNOSIS — L57.8 SUN-DAMAGED SKIN: ICD-10-CM

## 2019-10-07 DIAGNOSIS — D23.70 BENIGN NEOPLASM OF SKIN OF LOWER LIMB, INCLUDING HIP, UNSPECIFIED LATERALITY: ICD-10-CM

## 2019-10-07 PROCEDURE — 99213 OFFICE O/P EST LOW 20 MIN: CPT | Performed by: DERMATOLOGY

## 2019-10-07 PROCEDURE — 11102 TANGNTL BX SKIN SINGLE LES: CPT | Performed by: DERMATOLOGY

## 2019-10-07 PROCEDURE — 17000 DESTRUCT PREMALG LESION: CPT | Performed by: DERMATOLOGY

## 2019-10-07 PROCEDURE — 17110 DESTRUCTION B9 LES UP TO 14: CPT | Performed by: DERMATOLOGY

## 2019-10-07 NOTE — PROGRESS NOTES
HPI:     Chief Complaint     Full Skin Exam        HPI     Full Skin Exam      Additional comments: LOV 4/1/2019. Pt presenting for full body skin exam. Pt has a personal hx of SCC to L forearm and atypical nevus to the L medial ankle.            Last edite 1/30/2018   • SCCA (squamous cell carcinoma) of skin 2015 2015   • Seasonal allergies    • Squamous carcinoma 2015    left forearm, invasive, well diff.  arising with preexisting verruca     Past Surgical History:   Procedure Laterality Date   • COLONOSC Coffee, 3 cups daily;          Occupational Exposure: Not Asked        Hobby Hazards: Not Asked        Sleep Concern: Not Asked        Stress Concern: Not Asked        Weight Concern: Not Asked        Special Diet: Not Asked        Back Care: Not Asked neck  - there are some cherry red papules  – There is an inflamed pink and brown 6 mm keratotic lesion at the lateral left upper back  - there are numerous brown stuck on keratoses.   Some by the chest appear inflamed        ASSESSMENT/PLAN:   Neoplasm of u <15      DESTRUCTION PREMALIGNANT LESIONS, FIRST LES      Specimen to Pathology, Tissue [IHP Pt to [facility] lab]      Results From Past 48 Hours:  No results found for this or any previous visit (from the past 50 hour(s)).     Meds This Visit:      Imaging Or

## 2019-10-07 NOTE — PATIENT INSTRUCTIONS
We will let you know when pathology is available. If either spot beneath the neck or on the left back that we were treated with cryotherapy do not resolve, please call to follow-up for biopsy.

## 2019-11-15 ENCOUNTER — OFFICE VISIT (OUTPATIENT)
Dept: INTERNAL MEDICINE CLINIC | Facility: CLINIC | Age: 54
End: 2019-11-15
Payer: COMMERCIAL

## 2019-11-15 VITALS
HEART RATE: 69 BPM | TEMPERATURE: 98 F | WEIGHT: 187 LBS | RESPIRATION RATE: 20 BRPM | SYSTOLIC BLOOD PRESSURE: 120 MMHG | DIASTOLIC BLOOD PRESSURE: 78 MMHG | BODY MASS INDEX: 30.05 KG/M2 | HEIGHT: 66 IN

## 2019-11-15 DIAGNOSIS — S93.401D SPRAIN OF RIGHT ANKLE, UNSPECIFIED LIGAMENT, SUBSEQUENT ENCOUNTER: ICD-10-CM

## 2019-11-15 DIAGNOSIS — Z12.31 VISIT FOR SCREENING MAMMOGRAM: ICD-10-CM

## 2019-11-15 DIAGNOSIS — Z12.4 CERVICAL CANCER SCREENING: ICD-10-CM

## 2019-11-15 DIAGNOSIS — Z78.0 POSTMENOPAUSAL: ICD-10-CM

## 2019-11-15 DIAGNOSIS — Z00.00 ROUTINE GENERAL MEDICAL EXAMINATION AT A HEALTH CARE FACILITY: Primary | ICD-10-CM

## 2019-11-15 PROCEDURE — G0439 PPPS, SUBSEQ VISIT: HCPCS | Performed by: INTERNAL MEDICINE

## 2019-11-16 NOTE — PROGRESS NOTES
HPI:    Patient ID: Ginger Booth is a 47year old female.     HPI    /78 (BP Location: Left arm, Patient Position: Sitting, Cuff Size: large)   Pulse 69   Temp 98 °F (36.7 °C) (Oral)   Resp 20   Ht 5' 6\" (1.676 m)   Wt 187 lb (84.8 kg)   LMP 08/02/2 HIVES    Comment:Adhesive Glue - rash    HISTORY:  Past Medical History:   Diagnosis Date   • Anemia, iron deficiency    • Atypical nevus 2001    skin of left medial ankle   • Gastritis    • Hx of hand surgery     Right hand surgery   • Internal hemorrhoid eye exhibits no discharge. No scleral icterus. Neck: Neck supple. No thyromegaly present. Cardiovascular: Normal rate, regular rhythm, S1 normal, S2 normal, normal heart sounds and intact distal pulses. Exam reveals no gallop. No murmur heard.   Pulmo exam advised    (Z12.4) Cervical cancer screening  Plan: THINPREP PAP SMEAR B, HPV HIGH RISK , THIN PREP        COLLECTION, HPV HIGH RISK , THIN PREP         COLLECTION, THINPREP PAP SMEAR B, THINPREP PAP         SMEAR ONLY            Patient voiced unders

## 2019-11-18 ENCOUNTER — HOSPITAL ENCOUNTER (OUTPATIENT)
Dept: MAMMOGRAPHY | Facility: HOSPITAL | Age: 54
Discharge: HOME | End: 2019-11-18
Attending: INTERNAL MEDICINE
Payer: COMMERCIAL

## 2019-11-18 DIAGNOSIS — Z12.31 VISIT FOR SCREENING MAMMOGRAM: ICD-10-CM

## 2019-11-18 PROCEDURE — 77067 SCR MAMMO BI INCL CAD: CPT | Performed by: INTERNAL MEDICINE

## 2019-11-18 PROCEDURE — 77063 BREAST TOMOSYNTHESIS BI: CPT | Performed by: INTERNAL MEDICINE

## 2019-11-21 ENCOUNTER — HOSPITAL ENCOUNTER (OUTPATIENT)
Dept: MAMMOGRAPHY | Facility: HOSPITAL | Age: 54
Discharge: HOME | End: 2019-11-21
Attending: INTERNAL MEDICINE
Payer: COMMERCIAL

## 2019-11-21 ENCOUNTER — APPOINTMENT (OUTPATIENT)
Dept: LAB | Facility: HOSPITAL | Age: 54
End: 2019-11-21
Attending: INTERNAL MEDICINE
Payer: COMMERCIAL

## 2019-11-21 ENCOUNTER — TELEPHONE (OUTPATIENT)
Dept: INTERNAL MEDICINE CLINIC | Facility: CLINIC | Age: 54
End: 2019-11-21

## 2019-11-21 DIAGNOSIS — R92.8 ABNORMAL MAMMOGRAM: ICD-10-CM

## 2019-11-21 DIAGNOSIS — Z00.00 ROUTINE GENERAL MEDICAL EXAMINATION AT A HEALTH CARE FACILITY: ICD-10-CM

## 2019-11-21 DIAGNOSIS — R92.8 ABNORMAL MAMMOGRAM: Primary | ICD-10-CM

## 2019-11-21 PROCEDURE — 36415 COLL VENOUS BLD VENIPUNCTURE: CPT | Performed by: INTERNAL MEDICINE

## 2019-11-21 PROCEDURE — 84439 ASSAY OF FREE THYROXINE: CPT | Performed by: INTERNAL MEDICINE

## 2019-11-21 PROCEDURE — 85027 COMPLETE CBC AUTOMATED: CPT | Performed by: INTERNAL MEDICINE

## 2019-11-21 PROCEDURE — 80061 LIPID PANEL: CPT | Performed by: INTERNAL MEDICINE

## 2019-11-21 PROCEDURE — 77062 BREAST TOMOSYNTHESIS BI: CPT | Performed by: INTERNAL MEDICINE

## 2019-11-21 PROCEDURE — 80053 COMPREHEN METABOLIC PANEL: CPT | Performed by: INTERNAL MEDICINE

## 2019-11-21 PROCEDURE — 81015 MICROSCOPIC EXAM OF URINE: CPT

## 2019-11-21 PROCEDURE — 77066 DX MAMMO INCL CAD BI: CPT | Performed by: INTERNAL MEDICINE

## 2019-11-21 PROCEDURE — 83036 HEMOGLOBIN GLYCOSYLATED A1C: CPT | Performed by: INTERNAL MEDICINE

## 2019-11-21 PROCEDURE — 84443 ASSAY THYROID STIM HORMONE: CPT | Performed by: INTERNAL MEDICINE

## 2019-11-21 NOTE — TELEPHONE ENCOUNTER
Spoke with Rajwinder Barnett, from [facility] Dept. Patient needs following order prior to her leaving for [place]. Pended per below.

## 2019-11-21 NOTE — IMAGING NOTE
Discussed recommended breast biopsy with patient. Patient is being recommended for stereotactic biopsy of  bilat breast  Breast  by Dr. Vahid Prieto . She is on krill oil will stop 7 days before. She is   Has 2 children.  Mrs Manuel Menchaca is leaving  oot Dec 6 (Ibuprofen, Motrin, Advil, Aleve, or other antiinflammatory medication)  and Aspirin  81mg currently being taken    not recommended by  your physician on should be held for 5  days prior to biopsy.   On krill oil    - -Aspirin 81 mg being taken related to a Discussed with patient that some soreness and bruising is normal after biopsy but that prolonged or increased pain and bruising should be reported to the ordering physician.   An ace wrap will be applied over bra for added support and should be left on fo

## 2019-11-26 ENCOUNTER — TELEPHONE (OUTPATIENT)
Dept: OTHER | Age: 54
End: 2019-11-26

## 2019-11-26 NOTE — TELEPHONE ENCOUNTER
Patient calling back, advised that Dr Dee Quinn already approved the mammogram and she can schedule it by calling Central Scheduling.  States that she already did her mammogram on 11/21/19 and schedule for biopsy next week, asking if this is the same order,pl

## 2019-12-03 ENCOUNTER — HOSPITAL ENCOUNTER (OUTPATIENT)
Dept: MAMMOGRAPHY | Facility: HOSPITAL | Age: 54
Discharge: HOME | End: 2019-12-03
Attending: INTERNAL MEDICINE
Payer: COMMERCIAL

## 2019-12-03 VITALS — SYSTOLIC BLOOD PRESSURE: 111 MMHG | DIASTOLIC BLOOD PRESSURE: 70 MMHG | HEART RATE: 71 BPM

## 2019-12-03 VITALS — DIASTOLIC BLOOD PRESSURE: 68 MMHG | HEART RATE: 76 BPM | SYSTOLIC BLOOD PRESSURE: 106 MMHG

## 2019-12-03 DIAGNOSIS — R92.8 ABNORMAL MAMMOGRAM: ICD-10-CM

## 2019-12-03 DIAGNOSIS — R92.0 MICROCALCIFICATION OF BOTH BREASTS ON MAMMOGRAM: ICD-10-CM

## 2019-12-03 PROCEDURE — 19081 BX BREAST 1ST LESION STRTCTC: CPT | Performed by: INTERNAL MEDICINE

## 2019-12-03 PROCEDURE — 77065 DX MAMMO INCL CAD UNI: CPT | Performed by: INTERNAL MEDICINE

## 2019-12-03 PROCEDURE — 77061 BREAST TOMOSYNTHESIS UNI: CPT | Performed by: INTERNAL MEDICINE

## 2019-12-03 PROCEDURE — 88305 TISSUE EXAM BY PATHOLOGIST: CPT | Performed by: INTERNAL MEDICINE

## 2019-12-04 ENCOUNTER — TELEPHONE (OUTPATIENT)
Dept: OTHER | Age: 54
End: 2019-12-04

## 2019-12-04 DIAGNOSIS — R92.8 ABNORMAL MAMMOGRAM: Primary | ICD-10-CM

## 2019-12-04 NOTE — TELEPHONE ENCOUNTER
Abdi Brandon ,Breast center coordinator asking for order- 3d stereo right breast- stated Pt aware- Dr feels the calcification looks worrisome need more sampling   Order pending

## 2019-12-04 NOTE — IMAGING NOTE
Mrs Mily Tomlinson  is s/p biopsy . Phoned and introduced myself as breast coordinator . Reinforced to patient  post biopsy care and instructions . She received results from Dr Darlene Dawson but not the recommendations.  She was informed  that Dr Ryan Alves feels it is d Stereotactic biopsy right breast performed without immediate complications. RECOMMENDATION:       Concordant findings: Follow-up of the left breast in 1 year would be recommended.           Dictated by (CST): Estefani Fletcher MD on 12/04/2019 at 16:

## 2019-12-13 ENCOUNTER — HOSPITAL ENCOUNTER (OUTPATIENT)
Dept: MAMMOGRAPHY | Facility: HOSPITAL | Age: 54
Discharge: HOME | End: 2019-12-13
Attending: INTERNAL MEDICINE
Payer: COMMERCIAL

## 2019-12-13 VITALS — HEART RATE: 65 BPM | DIASTOLIC BLOOD PRESSURE: 73 MMHG | RESPIRATION RATE: 20 BRPM | SYSTOLIC BLOOD PRESSURE: 123 MMHG

## 2019-12-13 DIAGNOSIS — R92.8 ABNORMAL MAMMOGRAM: ICD-10-CM

## 2019-12-13 PROCEDURE — 77061 BREAST TOMOSYNTHESIS UNI: CPT | Performed by: INTERNAL MEDICINE

## 2019-12-13 PROCEDURE — 19081 BX BREAST 1ST LESION STRTCTC: CPT | Performed by: INTERNAL MEDICINE

## 2019-12-13 PROCEDURE — 88360 TUMOR IMMUNOHISTOCHEM/MANUAL: CPT | Performed by: INTERNAL MEDICINE

## 2019-12-13 PROCEDURE — 77065 DX MAMMO INCL CAD UNI: CPT | Performed by: INTERNAL MEDICINE

## 2019-12-13 PROCEDURE — 88305 TISSUE EXAM BY PATHOLOGIST: CPT | Performed by: INTERNAL MEDICINE

## 2019-12-13 NOTE — PROCEDURES
[facility]  Procedure Note    Shasta Camacho Patient Status:  Outpatient    1965 MRN [id number]   Location [address] [facility] Attending Shaun Amin MD   Hosp Day # 0 PCP Pretty Cintron MD

## 2019-12-13 NOTE — IMAGING NOTE
905 am Identified with spelling of name and date of birth. Medications and allergies reviewed. Denies taking aspirin containing medications, NSAIDs, fish oil, vitamin E  5 days prior to biopsy.  Denies prescription anti coagulating medications 5 days prior

## 2019-12-16 ENCOUNTER — TELEPHONE (OUTPATIENT)
Dept: SURGERY | Facility: CLINIC | Age: 54
End: 2019-12-16

## 2019-12-16 ENCOUNTER — TELEPHONE (OUTPATIENT)
Dept: ULTRASOUND IMAGING | Facility: HOSPITAL | Age: 54
End: 2019-12-16

## 2019-12-16 NOTE — TELEPHONE ENCOUNTER
Follow up call post procedure . No c/o post procedure. Mrs Heather Colon is set up to get results  From  result clinic. She was informed we would contact her tomorrow per Dr Kathia Hardin request with results and recommendations.  She does not want to come in for result

## 2019-12-17 ENCOUNTER — TELEPHONE (OUTPATIENT)
Dept: HEMATOLOGY/ONCOLOGY | Facility: HOSPITAL | Age: 54
End: 2019-12-17

## 2019-12-17 NOTE — TELEPHONE ENCOUNTER
Patient is s/p right breast stereotactic biopsy, performed 12/13/19. Patient has been referred to the Breast Biopsy Result Clinic to discuss pathology results with the Radiologist and myself. Patient has expressed her preference for phone conference.

## 2019-12-19 ENCOUNTER — OFFICE VISIT (OUTPATIENT)
Dept: SURGERY | Facility: CLINIC | Age: 54
End: 2019-12-19
Payer: COMMERCIAL

## 2019-12-19 VITALS
RESPIRATION RATE: 16 BRPM | WEIGHT: 187 LBS | DIASTOLIC BLOOD PRESSURE: 86 MMHG | OXYGEN SATURATION: 98 % | HEART RATE: 58 BPM | SYSTOLIC BLOOD PRESSURE: 130 MMHG | BODY MASS INDEX: 30.05 KG/M2 | HEIGHT: 66 IN

## 2019-12-19 DIAGNOSIS — D05.11 NEOPLASM OF RIGHT BREAST, PRIMARY TUMOR STAGING CATEGORY TIS: DUCTAL CARCINOMA IN SITU (DCIS): Primary | ICD-10-CM

## 2019-12-19 PROCEDURE — 99205 OFFICE O/P NEW HI 60 MIN: CPT | Performed by: SURGERY

## 2019-12-19 RX ORDER — OMEGA-3 FATTY ACIDS/FISH OIL 300-1000MG
CAPSULE ORAL
COMMUNITY

## 2019-12-19 NOTE — PATIENT INSTRUCTIONS
Surgeon: Dr Katie Ortega  [phone]  Fax [phone]  Procedure/Surgery: right breast wire bracketed lumpectomy     3 [address] [phone]  1.  Someone must accompany you the day of the procedure to

## 2019-12-20 ENCOUNTER — TELEPHONE (OUTPATIENT)
Dept: SURGERY | Facility: CLINIC | Age: 54
End: 2019-12-20

## 2019-12-20 DIAGNOSIS — D05.11 DUCTAL CARCINOMA IN SITU (DCIS) OF RIGHT BREAST: Primary | ICD-10-CM

## 2020-01-07 NOTE — PROGRESS NOTES
Breast Surgery New Patient Consultation    This is the first visit for this 47year old woman, referred by Dr. Emilia Hatfield, who presents for evaluation of Right breast DCIS.     History of Present Illness:   Ms. Kev Russo is a 47year old woman who pre both eyes with astigmatism 2009    OU   • Ocular migraine 2009    OS   • S/P colonoscopic polypectomy 1/30/2018   • SCCA (squamous cell carcinoma) of skin 2015 2015   • Seasonal allergies    • Squamous carcinoma 2015    left forearm, invasive, well diff ancestry. Social History:    Alcohol use Yes   Comment: Weekly Wine 5 drinks;          Smoking status: Never Smoker   Smokeless tobacco: Never Used   The patient is . She has 2 children. She is a homemaker.     Review of Systems:  General:   The swollen glands or lymph nodes. Musculoskeletal:  The patient denies muscle aches/pain, +joint pain, stiff joints, neck pain, back pain or bone pain.     Neuropsychiatric:  There is no history of migraines or severe headaches, seizure/epilepsy, speech pr tail is normal.  Left breast:   The skin, nipple, and areola appear normal. There is no skin dimpling with movement of the pectoralis. There is no nipple retraction. No nipple discharge can be elicited. The parenchyma is mildly nodular.  There are no domina size and grade of the lesion, and if mastectomy is planned for treatment. The reasons for this were explained.  I explained that for pure DCIS, systemic therapy is not required, although endocrine agents such as tamoxifen can be used in women with hormone s

## 2020-01-10 DIAGNOSIS — D05.11 NEOPLASM OF RIGHT BREAST, PRIMARY TUMOR STAGING CATEGORY TIS: DUCTAL CARCINOMA IN SITU (DCIS): Primary | ICD-10-CM

## 2020-01-13 ENCOUNTER — APPOINTMENT (OUTPATIENT)
Dept: MAMMOGRAPHY | Facility: HOSPITAL | Age: 55
End: 2020-01-13
Attending: SURGERY
Payer: COMMERCIAL

## 2020-01-13 ENCOUNTER — ANESTHESIA EVENT (OUTPATIENT)
Dept: SURGERY | Facility: HOSPITAL | Age: 55
End: 2020-01-13
Payer: COMMERCIAL

## 2020-01-13 ENCOUNTER — ANESTHESIA (OUTPATIENT)
Dept: SURGERY | Facility: HOSPITAL | Age: 55
End: 2020-01-13
Payer: COMMERCIAL

## 2020-01-13 ENCOUNTER — HOSPITAL ENCOUNTER (OUTPATIENT)
Dept: MAMMOGRAPHY | Facility: HOSPITAL | Age: 55
Discharge: HOME | End: 2020-01-13
Attending: SURGERY
Payer: COMMERCIAL

## 2020-01-13 ENCOUNTER — HOSPITAL ENCOUNTER (OUTPATIENT)
Facility: HOSPITAL | Age: 55
Setting detail: HOSPITAL OUTPATIENT SURGERY
Discharge: HOME | End: 2020-01-13
Attending: SURGERY | Admitting: SURGERY
Payer: COMMERCIAL

## 2020-01-13 VITALS
TEMPERATURE: 98 F | DIASTOLIC BLOOD PRESSURE: 75 MMHG | BODY MASS INDEX: 29.89 KG/M2 | SYSTOLIC BLOOD PRESSURE: 124 MMHG | OXYGEN SATURATION: 97 % | HEIGHT: 66 IN | WEIGHT: 186 LBS | RESPIRATION RATE: 16 BRPM | HEART RATE: 83 BPM

## 2020-01-13 DIAGNOSIS — D05.11 DUCTAL CARCINOMA IN SITU (DCIS) OF RIGHT BREAST: ICD-10-CM

## 2020-01-13 PROCEDURE — 0HBT0ZZ EXCISION OF RIGHT BREAST, OPEN APPROACH: ICD-10-PCS | Performed by: SURGERY

## 2020-01-13 PROCEDURE — 76098 X-RAY EXAM SURGICAL SPECIMEN: CPT | Performed by: SURGERY

## 2020-01-13 PROCEDURE — 88307 TISSUE EXAM BY PATHOLOGIST: CPT | Performed by: SURGERY

## 2020-01-13 PROCEDURE — 19281 PERQ DEVICE BREAST 1ST IMAG: CPT | Performed by: SURGERY

## 2020-01-13 PROCEDURE — 19282 PERQ DEVICE BREAST EA IMAG: CPT | Performed by: SURGERY

## 2020-01-13 RX ORDER — MORPHINE SULFATE 4 MG/ML
4 INJECTION, SOLUTION INTRAMUSCULAR; INTRAVENOUS EVERY 10 MIN PRN
Status: DISCONTINUED | OUTPATIENT
Start: 2020-01-13 | End: 2020-01-13

## 2020-01-13 RX ORDER — LIDOCAINE HYDROCHLORIDE 10 MG/ML
INJECTION, SOLUTION EPIDURAL; INFILTRATION; INTRACAUDAL; PERINEURAL AS NEEDED
Status: DISCONTINUED | OUTPATIENT
Start: 2020-01-13 | End: 2020-01-13 | Stop reason: SURG

## 2020-01-13 RX ORDER — FAMOTIDINE 20 MG/1
20 TABLET ORAL ONCE
Status: COMPLETED | OUTPATIENT
Start: 2020-01-13 | End: 2020-01-13

## 2020-01-13 RX ORDER — HYDROCODONE BITARTRATE AND ACETAMINOPHEN 5; 325 MG/1; MG/1
1 TABLET ORAL AS NEEDED
Status: DISCONTINUED | OUTPATIENT
Start: 2020-01-13 | End: 2020-01-13

## 2020-01-13 RX ORDER — ONDANSETRON 2 MG/ML
INJECTION INTRAMUSCULAR; INTRAVENOUS AS NEEDED
Status: DISCONTINUED | OUTPATIENT
Start: 2020-01-13 | End: 2020-01-13 | Stop reason: SURG

## 2020-01-13 RX ORDER — DEXAMETHASONE SODIUM PHOSPHATE 4 MG/ML
VIAL (ML) INJECTION AS NEEDED
Status: DISCONTINUED | OUTPATIENT
Start: 2020-01-13 | End: 2020-01-13 | Stop reason: SURG

## 2020-01-13 RX ORDER — PROCHLORPERAZINE EDISYLATE 5 MG/ML
5 INJECTION INTRAMUSCULAR; INTRAVENOUS ONCE AS NEEDED
Status: DISCONTINUED | OUTPATIENT
Start: 2020-01-13 | End: 2020-01-13

## 2020-01-13 RX ORDER — HYDROMORPHONE HYDROCHLORIDE 1 MG/ML
0.4 INJECTION, SOLUTION INTRAMUSCULAR; INTRAVENOUS; SUBCUTANEOUS EVERY 5 MIN PRN
Status: DISCONTINUED | OUTPATIENT
Start: 2020-01-13 | End: 2020-01-13

## 2020-01-13 RX ORDER — SODIUM CHLORIDE, SODIUM LACTATE, POTASSIUM CHLORIDE, CALCIUM CHLORIDE 600; 310; 30; 20 MG/100ML; MG/100ML; MG/100ML; MG/100ML
INJECTION, SOLUTION INTRAVENOUS CONTINUOUS
Status: DISCONTINUED | OUTPATIENT
Start: 2020-01-13 | End: 2020-01-13

## 2020-01-13 RX ORDER — NALOXONE HYDROCHLORIDE 0.4 MG/ML
80 INJECTION, SOLUTION INTRAMUSCULAR; INTRAVENOUS; SUBCUTANEOUS AS NEEDED
Status: DISCONTINUED | OUTPATIENT
Start: 2020-01-13 | End: 2020-01-13

## 2020-01-13 RX ORDER — HYDROMORPHONE HYDROCHLORIDE 1 MG/ML
0.2 INJECTION, SOLUTION INTRAMUSCULAR; INTRAVENOUS; SUBCUTANEOUS EVERY 5 MIN PRN
Status: DISCONTINUED | OUTPATIENT
Start: 2020-01-13 | End: 2020-01-13

## 2020-01-13 RX ORDER — LIDOCAINE HYDROCHLORIDE AND EPINEPHRINE 10; 10 MG/ML; UG/ML
INJECTION, SOLUTION INFILTRATION; PERINEURAL AS NEEDED
Status: DISCONTINUED | OUTPATIENT
Start: 2020-01-13 | End: 2020-01-13 | Stop reason: HOSPADM

## 2020-01-13 RX ORDER — HYDROCODONE BITARTRATE AND ACETAMINOPHEN 5; 325 MG/1; MG/1
2 TABLET ORAL AS NEEDED
Status: DISCONTINUED | OUTPATIENT
Start: 2020-01-13 | End: 2020-01-13

## 2020-01-13 RX ORDER — METOCLOPRAMIDE 10 MG/1
10 TABLET ORAL ONCE
Status: COMPLETED | OUTPATIENT
Start: 2020-01-13 | End: 2020-01-13

## 2020-01-13 RX ORDER — CEFAZOLIN SODIUM/WATER 2 G/20 ML
2 SYRINGE (ML) INTRAVENOUS ONCE
Status: COMPLETED | OUTPATIENT
Start: 2020-01-13 | End: 2020-01-13

## 2020-01-13 RX ORDER — MORPHINE SULFATE 10 MG/ML
6 INJECTION, SOLUTION INTRAMUSCULAR; INTRAVENOUS EVERY 10 MIN PRN
Status: DISCONTINUED | OUTPATIENT
Start: 2020-01-13 | End: 2020-01-13

## 2020-01-13 RX ORDER — MORPHINE SULFATE 4 MG/ML
2 INJECTION, SOLUTION INTRAMUSCULAR; INTRAVENOUS EVERY 10 MIN PRN
Status: DISCONTINUED | OUTPATIENT
Start: 2020-01-13 | End: 2020-01-13

## 2020-01-13 RX ORDER — HALOPERIDOL 5 MG/ML
0.25 INJECTION INTRAMUSCULAR ONCE AS NEEDED
Status: DISCONTINUED | OUTPATIENT
Start: 2020-01-13 | End: 2020-01-13

## 2020-01-13 RX ORDER — HYDROMORPHONE HYDROCHLORIDE 1 MG/ML
0.6 INJECTION, SOLUTION INTRAMUSCULAR; INTRAVENOUS; SUBCUTANEOUS EVERY 5 MIN PRN
Status: DISCONTINUED | OUTPATIENT
Start: 2020-01-13 | End: 2020-01-13

## 2020-01-13 RX ORDER — HYDROCODONE BITARTRATE AND ACETAMINOPHEN 5; 325 MG/1; MG/1
1-2 TABLET ORAL EVERY 6 HOURS PRN
Qty: 30 TABLET | Refills: 0 | Status: SHIPPED | OUTPATIENT
Start: 2020-01-13 | End: 2020-02-07 | Stop reason: ALTCHOICE

## 2020-01-13 RX ORDER — BUPIVACAINE HYDROCHLORIDE 5 MG/ML
INJECTION, SOLUTION EPIDURAL; INTRACAUDAL AS NEEDED
Status: DISCONTINUED | OUTPATIENT
Start: 2020-01-13 | End: 2020-01-13 | Stop reason: HOSPADM

## 2020-01-13 RX ORDER — ONDANSETRON 2 MG/ML
4 INJECTION INTRAMUSCULAR; INTRAVENOUS ONCE AS NEEDED
Status: DISCONTINUED | OUTPATIENT
Start: 2020-01-13 | End: 2020-01-13

## 2020-01-13 RX ORDER — MIDAZOLAM HYDROCHLORIDE 1 MG/ML
INJECTION INTRAMUSCULAR; INTRAVENOUS AS NEEDED
Status: DISCONTINUED | OUTPATIENT
Start: 2020-01-13 | End: 2020-01-13 | Stop reason: SURG

## 2020-01-13 RX ORDER — ACETAMINOPHEN 500 MG
1000 TABLET ORAL ONCE
Status: COMPLETED | OUTPATIENT
Start: 2020-01-13 | End: 2020-01-13

## 2020-01-13 RX ADMIN — CEFAZOLIN SODIUM/WATER 2 G: 2 G/20 ML SYRINGE (ML) INTRAVENOUS at 13:22:00

## 2020-01-13 RX ADMIN — DEXAMETHASONE SODIUM PHOSPHATE 4 MG: 4 MG/ML VIAL (ML) INJECTION at 13:25:00

## 2020-01-13 RX ADMIN — MIDAZOLAM HYDROCHLORIDE 2 MG: 1 INJECTION INTRAMUSCULAR; INTRAVENOUS at 13:07:00

## 2020-01-13 RX ADMIN — ONDANSETRON 4 MG: 2 INJECTION INTRAMUSCULAR; INTRAVENOUS at 13:22:00

## 2020-01-13 RX ADMIN — LIDOCAINE HYDROCHLORIDE 50 MG: 10 INJECTION, SOLUTION EPIDURAL; INFILTRATION; INTRACAUDAL; PERINEURAL at 13:16:00

## 2020-01-13 RX ADMIN — DEXAMETHASONE SODIUM PHOSPHATE 4 MG: 4 MG/ML VIAL (ML) INJECTION at 13:22:00

## 2020-01-13 RX ADMIN — SODIUM CHLORIDE, SODIUM LACTATE, POTASSIUM CHLORIDE, CALCIUM CHLORIDE: 600; 310; 30; 20 INJECTION, SOLUTION INTRAVENOUS at 13:54:00

## 2020-01-13 NOTE — PROCEDURES
[facility]  Procedure Note    Waneta Nick Patient Status:  Outpatient    1965 MRN [id number]   Location [address] [facility] [place] Attending Krista Cervantes MD   [facility] Day # 0 PCP Magnus Joseph MD

## 2020-01-13 NOTE — ANESTHESIA PROCEDURE NOTES
Airway  Urgency: Elective      General Information and Staff    Patient location during procedure: OR  Anesthesiologist: Jayden Kim MD  Resident/CRNA: Rena Lowe CRNA  Performed: anesthesiologist and CRNA     Indications and Patient Condition

## 2020-01-13 NOTE — IMAGING NOTE
200 Pt  to mammography department scouts completed by HELLEN FLORES [facility] SERV, mammography technologist     5692 Hx taken procedure explained questions answered. Order verified and initialed by all staff members .      [id number] Consent signed and verified      [phone] Dr Jayesh Dumont

## 2020-01-13 NOTE — ANESTHESIA PREPROCEDURE EVALUATION
Anesthesia PreOp Note    HPI:     Keon Chapin is a 47year old female who presents for preoperative consultation requested by: Alma Campos MD    Date of Surgery: 1/13/2020    Procedure(s):  BREAST BIOPSY NEEDLE LOCALIZATION  BREAST LUMPECTOMY hand surgery     Right hand surgery   • Internal hemorrhoids 1/30/2018   • MGD (meibomian gland dysfunction) 2009    OU   • Myopia of both eyes with astigmatism 2009    OU   • Ocular migraine 2009    OS   • S/P colonoscopic polypectomy 1/30/2018   • SCCA ( Disorder Paternal Grandmother         Alzheimer's disease   • Heart Disease Paternal Grandfather         CAD   • Colon Cancer Other         Family H/o   • Other (possible hx of skin cancer) Brother    • Uterine Cancer Maternal Aunt 76     Social History Special Diet: Not Asked        Back Care: Not Asked        Exercise: Not Asked        Bike Helmet: Not Asked        Seat Belt: Not Asked        Self-Exams: Not Asked        Grew up on a farm: Not Asked        History of tanning: Not Asked        Outdoor oc Abdominal                Anesthesia Plan:   ASA:  1  Plan:   General  Airway:  LMA  Informed Consent Plan and Risks Discussed With:  Patient and spouse      I have informed Shasta Camacho and/or legal guardian or family member of the nature of the anes

## 2020-01-13 NOTE — BRIEF OP NOTE
Pre-Operative Diagnosis: Ductal carcinoma in situ (DCIS) of right breast [D05.11]     Post-Operative Diagnosis: Ductal carcinoma in situ (DCIS) of right breast [D05.11]      Procedure Performed:   Procedure(s):  Right breast wire bracketed lumpectomy

## 2020-01-13 NOTE — ANESTHESIA POSTPROCEDURE EVALUATION
Patient: Johnnie Gomez    Procedure Summary     Date:  01/13/20 Room / Location:  [facility] OR  / [facility] OR    Anesthesia Start:  6605 Anesthesia Stop:      Procedures:       BREAST BIOPSY NEEDLE LOCALIZATION (Right Breast)      BREAST LUMPECTOMY (

## 2020-01-13 NOTE — H&P
History of Present Illness:   Ms. Gely Encarnacion is a 47year old woman who presents with imaging detected changes to the right breast.  The patient denies any palpable masses, nipple discharge, skin changes or axillary symptoms.   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  arising with preexisting verruca               Past Surgical History:   Procedure Laterality Date   • COLONOSCOPY   2010   • COLONOSCOPY N/A 1/30/2018     Smoker   Smokeless tobacco: Never Used   The patient is . She has 2 children. She is a homemaker.     Review of Systems:  General:   The patient denies, fever, chills, night sweats, fatigue, generalized weakness, change in appetite or weight loss. stiff joints, neck pain, back pain or bone pain.     Neuropsychiatric:  There is no history of migraines or severe headaches, seizure/epilepsy, speech problems, coordination problems, trembling/tremors, fainting/black outs, dizziness, memory problems, loss dimpling with movement of the pectoralis. There is no nipple retraction. No nipple discharge can be elicited. The parenchyma is mildly nodular. There are no dominant masses in the breast. The axillary tail is normal.     Abdomen:   The abdomen is soft, flat for this were explained.  I explained that for pure DCIS, systemic therapy is not required, although endocrine agents such as tamoxifen can be used in women with hormone sensitive disease in order to reduce the risk of local recurrence and future new Primar

## 2020-01-14 NOTE — OPERATIVE REPORT
[facility]    PATIENT'S NAME: Jacek Matt   ATTENDING PHYSICIAN: Brando Rivas. Mery Reddy MD   OPERATING PHYSICIAN: Brando Rivas.  Mery Reddy MD   PATIENT ACCOUNT#:   [id number]    LOCATION:  [address]  MEDICAL RECORD #:   [id number] compression devices were applied to her legs for DVT prophylaxis. General anesthesia was induced. Her right breast was prepped and draped in the usual sterile fashion.   Lidocaine 1% with epinephrine was used to infiltrate the skin and subcutaneous tissue interrupted 3-0 Vicryl for deep layer and a running 4-0 subcuticular Monocryl for the skin, and Dermabond was placed secondary to patient's adhesive allergies. A sterile dressing and compression bra were placed.   Her blood loss was minimal.  All counts we

## 2020-01-22 ENCOUNTER — OFFICE VISIT (OUTPATIENT)
Dept: SURGERY | Facility: CLINIC | Age: 55
End: 2020-01-22
Payer: COMMERCIAL

## 2020-01-22 VITALS
RESPIRATION RATE: 18 BRPM | DIASTOLIC BLOOD PRESSURE: 73 MMHG | WEIGHT: 191 LBS | BODY MASS INDEX: 31 KG/M2 | SYSTOLIC BLOOD PRESSURE: 116 MMHG | TEMPERATURE: 98 F | HEART RATE: 69 BPM

## 2020-01-22 DIAGNOSIS — D05.11 BREAST NEOPLASM, TIS (DCIS), RIGHT: Primary | ICD-10-CM

## 2020-01-22 DIAGNOSIS — R92.8 ABNORMAL MAMMOGRAM OF LEFT BREAST: ICD-10-CM

## 2020-01-22 PROCEDURE — 99024 POSTOP FOLLOW-UP VISIT: CPT | Performed by: SURGERY

## 2020-02-07 ENCOUNTER — OFFICE VISIT (OUTPATIENT)
Dept: RADIATION ONCOLOGY | Facility: HOSPITAL | Age: 55
End: 2020-02-07
Attending: RADIOLOGY
Payer: COMMERCIAL

## 2020-02-07 ENCOUNTER — OFFICE VISIT (OUTPATIENT)
Dept: HEMATOLOGY/ONCOLOGY | Facility: HOSPITAL | Age: 55
End: 2020-02-07
Attending: INTERNAL MEDICINE
Payer: COMMERCIAL

## 2020-02-07 VITALS
HEART RATE: 68 BPM | SYSTOLIC BLOOD PRESSURE: 109 MMHG | WEIGHT: 186 LBS | DIASTOLIC BLOOD PRESSURE: 76 MMHG | BODY MASS INDEX: 29.89 KG/M2 | TEMPERATURE: 99 F | OXYGEN SATURATION: 97 % | RESPIRATION RATE: 16 BRPM | HEIGHT: 66 IN

## 2020-02-07 DIAGNOSIS — Z78.0 POST-MENOPAUSAL: ICD-10-CM

## 2020-02-07 DIAGNOSIS — D05.11 BREAST NEOPLASM, TIS (DCIS), RIGHT: Primary | ICD-10-CM

## 2020-02-07 PROCEDURE — 99212 OFFICE O/P EST SF 10 MIN: CPT

## 2020-02-07 PROCEDURE — 99204 OFFICE O/P NEW MOD 45 MIN: CPT | Performed by: INTERNAL MEDICINE

## 2020-02-07 NOTE — PROGRESS NOTES
Nursing Consultation Note  Patient: Ale Mederos  YOB: 1965  Age: 47year old  Radiation Oncologist: Dr. Seth Darling  Referring Physician: Janette Mercado  Diagnosis:No diagnosis found.   Consult Date: 2/7/2020      Chemotherapy: No  Lab medial ankle   • Breast cancer ([address] 2019    right breast   • Gastritis    • Hx of hand surgery     Right hand surgery   • Internal hemorrhoids 1/30/2018   • MGD (meibomian gland dysfunction) 2009    OU   • Myopia of both eyes with astigmatism 2009    OU Days per week: Not on file        Minutes per session: Not on file      Stress: Not on file    Relationships      Social connections:        Talks on phone: Not on file        Gets together: Not on file        Attends Adventism service: Not on file discomfort. Going to see her Dad in [place] as he is having open heart surgery next week. Discussed the radiation process and need for CT simulation, which is scheduled for 2/11 @ 1100 prior to pt leaving.  Discussed potential side effects of breast radiat activities  Discuss methods to balance rest and activity  Promote excercise within patient's capability  Encourage adequate nutrition and hydration    Expected Outcomes:  Patient exhibits fatigue management strategies    Progress Toward Outcome:  Unchanged

## 2020-02-07 NOTE — PROGRESS NOTES
KIKE Sneed is a 47year old female who is here today due to new diagnosis of Breast neoplasm, tis (dcis), right  (primary encounter diagnosis)     Patient underwent screening mammogram November 18 of 2019 which found a new bilateral breas Breast fed 19 months. OCP use in the past:  Yes. Cumulative number of years:  10  HRT use:  No  Fertility treatment:  No  Ethnic back ground:    Positive family h/o breast cancer.   First degree relatives:  1  She has a Body mass index is 30.02 1/30/2018   • SCCA (squamous cell carcinoma) of skin 2015 2015   • Seasonal allergies    • Squamous carcinoma 2015    left forearm, invasive, well diff.  arising with preexisting verruca     Past Surgical History:   Procedure Laterality Date   • BREAST B Neck is supple. Chest: Clear to auscultation. Heart: Regular rate and rhythm. Abdomen: Soft, non tender with good bowel sounds. Extremities: No edema. Neurological: Grossly intact. Lymphatics:  There is no palpable lymphadenopathy throughout in the the therapy will be for 5 yrs total as is the current standard of care for post-menopausal women. --F/u after radiation therapy to initiate prophylactic anastrozole.   At that time we will also discuss the patient's surveillance schedule and imaging s G) - Breast, right, 7. RIGHT BREAST INFERIOR MARGIN, CLIP KILLIAN TRUE MARGIN                Final Diagnosis:      A.  Right breast; wire bracketed lumpectomy:  · Portion of breast parenchymal and fibroadipose tissue demonstrating an aggregate focus of ducta carcinoma in situ, infiltrating carcinoma, or other malignant neoplastic infiltrates.       C. Right breast, posterior/deep margin; excision:  · Portion of breast parenchymal and fibroadipose tissue, including inked true specimen margin, demonstrating no ev patterns, associated microcalcifications, and scattered cells showing moderate to strong estrogen receptor expression (5%) and weak progesterone receptor expression (2%) by associated manual morphometric analysis.      Although, DCIS within the lumpectomy ( method) using the ASCO/CAP scoring criteria, performed at [facility] on formalin fixed paraffin embedded tissue:      Estrogen receptor (Leica, monoclonal, clone 6 [id number]) status: 5% (Positive, moderate to strong intensity)  Progesterone recep diagnostic imaging of breast.  Patient was recall to further assess microcalcifications in the bilateral breasts. TECHNIQUE:    Digital diagnostic mammography of both breasts was performed. 3D tomosynthesis was performed and reviewed.           BREAST C Square = Pain            Dictated by (CST): Shoaib Hinkle MD on 11/21/2019 at 8:21       Approved by (CST): Shoaib Hinkle MD on 11/21/2019 at 8:30     PROCEDURE:  [facility] MALLORIE 2D+3D SCREENING BILAT ([id number])     COMPARISON: [facility] for summary from the technologist after completion of exam.     Breast marker legend used on images    Triangle = Palpable lump  [place] = Skin tag or mole  BB = Nipple  Linear david = Scar   Square = Pain            Dictated by (CST): Jose Hanson MD

## 2020-02-07 NOTE — CONSULTS
RADIATION ONCOLOGY NOTE    DATE OF VISIT: 2/7/2020    DIAGNOSIS: Stage 0 TisNOMO, DCIS of right breast, s/p lumpectomy,     Dear Jazzmine Narayan and colleagues,    Per breast tumor board, Mrs. Kev Russo is a pleasant 47year old female with DCIS o STEREO W/MALLORIE 3D 1 SITE RIGHT(CPT=19081/36087), 12/13/2019, 10:01. [facility], [facility].  for [facility], [facility] POST [facility] IMAGE LEFT (SHW=[id number]), 12/03/2019, 11:52.   [facility], [facility] LOCALIZATION WIRE 2 SITE RIGHT (RAD=[id number]), 1/13 dysfunction) (2009), Myopia of both eyes with astigmatism (2009), Ocular migraine (2009), S/P colonoscopic polypectomy (1/30/2018), SCCA (squamous cell carcinoma) of skin (2015), Seasonal allergies, and Squamous carcinoma (2015).  She also has no past medic EXAM  Last menstrual period 08/02/2017. PERFORMANCE STATUS 0 , denies PAIN  GENERAL:  Pt is alert and oriented times three in no acute distress. HEENT:  PERRLA, EOMI, oropharynx is clear with no lesions.   NECK:  Supple with no cervical, supraclavicular Pathologist:           Nayana Veras MD                                                            Specimens:   A) - Breast, right, 1. RIGHT BREAST BRACKETED LUMPECTOMY SHORT STITCH + SINGLE CLIP granulomatous multinucleated foreign body giant cells, mild to moderate lymphohistiocytic chronic inflammatory infiltrates, surrounding dense fibrosis with mild reactive fibroblastic cellular reaction, and adjacent areas of fat necrosis present in close pr F. Right breast, superior margin; excision:  · Portion of breast parenchymal and fibroadipose tissue, including inked true specimen margin, demonstrating no evidence of atypical epithelial hyperplasia, carcinoma in situ, infiltrating carcinoma, or other Protocol posted: 2/27/2019   SPECIMEN   Procedure  Wire bracketed lumpectomy    Specimen Laterality  Right    TUMOR   Tumor Site  Outer quadrant anterior    Histologic Type  Ductal carcinoma in situ    Size (Extent) of DCIS  Estimated size (extent) of DCIS slices from medial (slice 1) to lateral (slice 9) to reveal ill-defined, fibrous areas, predominantly in slices 2-7 (2.2 cm in greatest dimension). These fibrous areas approach the superior, posterior margins. No definitve masses are grossly identified.  Bernett Scheuermann processed in pathology at 2:17 p.m., and placed in fresh formalin at this time. The specimen was removed from formalin for further processing at 9:30 p.m.      Specimen D is submitted in formalin labeled “Emile, right breast medial margin, clip marks true removed from formalin for further processing at 9:30 p.m. Specimen G is submitted in formalin labeled “Emile, right breast inferior margin, clip marks true margin.” The specimen consists of a 2 gram piece of yellow-tan fatty tissue measuring 2.4 x 2. 3 1 · SURGICAL PATHOLOGY TISSUE     Description: 1. RIGHT BREAST BRACKETED LUMPECTOMY SHORT STITCH + SINGLE CLIP= SUPERIOR MARGIN, LONG STITCH + DOUBLE CLIP= LATERAL MARGIN   2 · SURGICAL PATHOLOGY TISSUE     Description: 2.  RIGHT BREAST ANTERIOR MARGIN, C

## 2020-02-07 NOTE — PATIENT INSTRUCTIONS
Anastrozole tablets  Brand Name: Arimidex  What is this medicine? ANASTROZOLE (an AS troe zole) is used to treat breast cancer in women who have gone through menopause.  Some types of breast cancer depend on estrogen to grow, and this medicine can stop t Keep out of the reach of children. Store at room temperature between 20 and 25 degrees C (68 and 77 degrees F). Throw away any unused medicine after the expiration date. What should I tell my health care provider before I take this medicine?   They need t

## 2020-02-11 ENCOUNTER — APPOINTMENT (OUTPATIENT)
Dept: RADIATION ONCOLOGY | Facility: HOSPITAL | Age: 55
End: 2020-02-11
Attending: RADIOLOGY
Payer: COMMERCIAL

## 2020-02-11 PROCEDURE — 77290 THER RAD SIMULAJ FIELD CPLX: CPT | Performed by: RADIOLOGY

## 2020-02-11 PROCEDURE — 77333 RADIATION TREATMENT AID(S): CPT | Performed by: RADIOLOGY

## 2020-02-12 PROCEDURE — 77300 RADIATION THERAPY DOSE PLAN: CPT | Performed by: RADIOLOGY

## 2020-02-12 PROCEDURE — 77295 3-D RADIOTHERAPY PLAN: CPT | Performed by: RADIOLOGY

## 2020-02-12 PROCEDURE — 77334 RADIATION TREATMENT AID(S): CPT | Performed by: RADIOLOGY

## 2020-02-17 ENCOUNTER — APPOINTMENT (OUTPATIENT)
Dept: RADIATION ONCOLOGY | Facility: HOSPITAL | Age: 55
End: 2020-02-17
Attending: RADIOLOGY
Payer: COMMERCIAL

## 2020-02-17 VITALS
SYSTOLIC BLOOD PRESSURE: 111 MMHG | TEMPERATURE: 99 F | RESPIRATION RATE: 16 BRPM | OXYGEN SATURATION: 96 % | HEART RATE: 67 BPM | DIASTOLIC BLOOD PRESSURE: 77 MMHG

## 2020-02-17 PROCEDURE — 77412 RADIATION TX DELIVERY LVL 3: CPT | Performed by: RADIOLOGY

## 2020-02-17 PROCEDURE — 77280 THER RAD SIMULAJ FIELD SMPL: CPT | Performed by: RADIOLOGY

## 2020-02-17 NOTE — PROGRESS NOTES
[facility] Radiation Treatment Management Note 1-5    Patient:  Arely Campbell  Age:  47year old  Visit Diagnosis:  No diagnosis found.   Primary Rad/Onc:  Dr. Kelsey Lee Mccatry    Site Delivered Dose (Gy) Prescribed Dose (Gy) Fraction #

## 2020-02-19 PROCEDURE — 77387 GUIDANCE FOR RADJ TX DLVR: CPT | Performed by: RADIOLOGY

## 2020-02-19 PROCEDURE — 77412 RADIATION TX DELIVERY LVL 3: CPT | Performed by: RADIOLOGY

## 2020-02-20 PROCEDURE — 77412 RADIATION TX DELIVERY LVL 3: CPT | Performed by: RADIOLOGY

## 2020-02-20 PROCEDURE — 77290 THER RAD SIMULAJ FIELD CPLX: CPT | Performed by: RADIOLOGY

## 2020-02-21 PROCEDURE — 77387 GUIDANCE FOR RADJ TX DLVR: CPT | Performed by: RADIOLOGY

## 2020-02-21 PROCEDURE — 77412 RADIATION TX DELIVERY LVL 3: CPT | Performed by: RADIOLOGY

## 2020-02-24 ENCOUNTER — OFFICE VISIT (OUTPATIENT)
Dept: RADIATION ONCOLOGY | Facility: HOSPITAL | Age: 55
End: 2020-02-24
Attending: RADIOLOGY
Payer: COMMERCIAL

## 2020-02-24 VITALS
RESPIRATION RATE: 16 BRPM | TEMPERATURE: 99 F | SYSTOLIC BLOOD PRESSURE: 122 MMHG | DIASTOLIC BLOOD PRESSURE: 78 MMHG | OXYGEN SATURATION: 98 % | HEART RATE: 62 BPM

## 2020-02-24 DIAGNOSIS — D05.11 BREAST NEOPLASM, TIS (DCIS), RIGHT: Primary | ICD-10-CM

## 2020-02-24 PROCEDURE — 77412 RADIATION TX DELIVERY LVL 3: CPT | Performed by: RADIOLOGY

## 2020-02-24 PROCEDURE — 77387 GUIDANCE FOR RADJ TX DLVR: CPT | Performed by: RADIOLOGY

## 2020-02-24 NOTE — PROGRESS NOTES
[facility] Radiation Treatment Management Note 1-5    Patient:  Arminda Breen  Age:  47year old  Visit Diagnosis:  R breast DCIS, ER 5% positive   Primary Rad/Onc:  Dr. Jayda Green    Site Delivered Dose (Gy) Prescribed Dose (Gy) F

## 2020-02-25 PROCEDURE — 77387 GUIDANCE FOR RADJ TX DLVR: CPT | Performed by: RADIOLOGY

## 2020-02-25 PROCEDURE — 77412 RADIATION TX DELIVERY LVL 3: CPT | Performed by: RADIOLOGY

## 2020-02-26 PROCEDURE — 77412 RADIATION TX DELIVERY LVL 3: CPT | Performed by: RADIOLOGY

## 2020-02-26 PROCEDURE — 77387 GUIDANCE FOR RADJ TX DLVR: CPT | Performed by: RADIOLOGY

## 2020-02-27 PROCEDURE — 77387 GUIDANCE FOR RADJ TX DLVR: CPT | Performed by: RADIOLOGY

## 2020-02-27 PROCEDURE — 77412 RADIATION TX DELIVERY LVL 3: CPT | Performed by: RADIOLOGY

## 2020-02-28 ENCOUNTER — HOSPITAL ENCOUNTER (OUTPATIENT)
Dept: BONE DENSITY | Age: 55
Discharge: HOME | End: 2020-02-28
Attending: INTERNAL MEDICINE
Payer: COMMERCIAL

## 2020-02-28 DIAGNOSIS — Z78.0 POST-MENOPAUSAL: ICD-10-CM

## 2020-02-28 PROCEDURE — 77336 RADIATION PHYSICS CONSULT: CPT | Performed by: RADIOLOGY

## 2020-02-28 PROCEDURE — 77412 RADIATION TX DELIVERY LVL 3: CPT | Performed by: RADIOLOGY

## 2020-02-28 PROCEDURE — 77080 DXA BONE DENSITY AXIAL: CPT | Performed by: INTERNAL MEDICINE

## 2020-02-28 PROCEDURE — 77387 GUIDANCE FOR RADJ TX DLVR: CPT | Performed by: RADIOLOGY

## 2020-03-01 ENCOUNTER — APPOINTMENT (OUTPATIENT)
Dept: RADIATION ONCOLOGY | Facility: HOSPITAL | Age: 55
End: 2020-03-01
Attending: RADIOLOGY
Payer: COMMERCIAL

## 2020-03-02 ENCOUNTER — OFFICE VISIT (OUTPATIENT)
Dept: RADIATION ONCOLOGY | Facility: HOSPITAL | Age: 55
End: 2020-03-02
Attending: RADIOLOGY
Payer: COMMERCIAL

## 2020-03-02 VITALS
RESPIRATION RATE: 15 BRPM | TEMPERATURE: 99 F | OXYGEN SATURATION: 98 % | HEART RATE: 80 BPM | SYSTOLIC BLOOD PRESSURE: 117 MMHG | DIASTOLIC BLOOD PRESSURE: 69 MMHG

## 2020-03-02 PROCEDURE — 77387 GUIDANCE FOR RADJ TX DLVR: CPT | Performed by: RADIOLOGY

## 2020-03-02 PROCEDURE — 77412 RADIATION TX DELIVERY LVL 3: CPT | Performed by: RADIOLOGY

## 2020-03-02 NOTE — PROGRESS NOTES
[facility] Radiation Treatment Management Note 6-10    Patient:  Eduin Sneed  Age:  47year old  Visit Diagnosis:  R breast DCIS, ER 5% positive   Primary Rad/Onc:  Dr. Dione Fortune Mccarty    Site Delivered Dose (Gy) Prescribed Dose (Gy)

## 2020-03-03 ENCOUNTER — APPOINTMENT (OUTPATIENT)
Dept: RADIATION ONCOLOGY | Facility: HOSPITAL | Age: 55
End: 2020-03-03
Attending: RADIOLOGY
Payer: COMMERCIAL

## 2020-03-03 PROCEDURE — 77412 RADIATION TX DELIVERY LVL 3: CPT | Performed by: RADIOLOGY

## 2020-03-03 PROCEDURE — 77290 THER RAD SIMULAJ FIELD CPLX: CPT | Performed by: RADIOLOGY

## 2020-03-03 PROCEDURE — 77334 RADIATION TREATMENT AID(S): CPT | Performed by: RADIOLOGY

## 2020-03-04 PROCEDURE — 77295 3-D RADIOTHERAPY PLAN: CPT | Performed by: RADIOLOGY

## 2020-03-04 PROCEDURE — 77300 RADIATION THERAPY DOSE PLAN: CPT | Performed by: RADIOLOGY

## 2020-03-04 PROCEDURE — 77412 RADIATION TX DELIVERY LVL 3: CPT | Performed by: RADIOLOGY

## 2020-03-04 PROCEDURE — 77334 RADIATION TREATMENT AID(S): CPT | Performed by: RADIOLOGY

## 2020-03-05 PROCEDURE — 77387 GUIDANCE FOR RADJ TX DLVR: CPT | Performed by: RADIOLOGY

## 2020-03-05 PROCEDURE — 77412 RADIATION TX DELIVERY LVL 3: CPT | Performed by: RADIOLOGY

## 2020-03-05 RX ORDER — MOMETASONE FUROATE 1 MG/G
2 OINTMENT TOPICAL DAILY
Qty: 45 G | Refills: 1 | Status: SHIPPED | OUTPATIENT
Start: 2020-03-05 | End: 2020-09-29 | Stop reason: ALTCHOICE

## 2020-03-06 PROCEDURE — 77387 GUIDANCE FOR RADJ TX DLVR: CPT | Performed by: RADIOLOGY

## 2020-03-06 PROCEDURE — 77412 RADIATION TX DELIVERY LVL 3: CPT | Performed by: RADIOLOGY

## 2020-03-06 PROCEDURE — 77336 RADIATION PHYSICS CONSULT: CPT | Performed by: RADIOLOGY

## 2020-03-09 ENCOUNTER — OFFICE VISIT (OUTPATIENT)
Dept: RADIATION ONCOLOGY | Facility: HOSPITAL | Age: 55
End: 2020-03-09
Attending: RADIOLOGY
Payer: COMMERCIAL

## 2020-03-09 VITALS
DIASTOLIC BLOOD PRESSURE: 78 MMHG | RESPIRATION RATE: 16 BRPM | SYSTOLIC BLOOD PRESSURE: 127 MMHG | OXYGEN SATURATION: 99 % | TEMPERATURE: 98 F | HEART RATE: 57 BPM

## 2020-03-09 PROCEDURE — 77387 GUIDANCE FOR RADJ TX DLVR: CPT | Performed by: RADIOLOGY

## 2020-03-09 PROCEDURE — 77412 RADIATION TX DELIVERY LVL 3: CPT | Performed by: RADIOLOGY

## 2020-03-09 NOTE — PROGRESS NOTES
[facility] Radiation Treatment Management Note 11-15    Patient:  Eve Andrew  Age:  47year old  Visit Diagnosis:  No diagnosis found.   Primary Rad/Onc:  Dr. Kim Angel Mccarty    Site Delivered Dose (Gy) Prescribed Dose (Gy) Fraction #

## 2020-03-10 PROCEDURE — 77387 GUIDANCE FOR RADJ TX DLVR: CPT | Performed by: RADIOLOGY

## 2020-03-10 PROCEDURE — 77412 RADIATION TX DELIVERY LVL 3: CPT | Performed by: RADIOLOGY

## 2020-03-11 ENCOUNTER — APPOINTMENT (OUTPATIENT)
Dept: RADIATION ONCOLOGY | Facility: HOSPITAL | Age: 55
End: 2020-03-11
Attending: RADIOLOGY
Payer: COMMERCIAL

## 2020-03-11 PROCEDURE — 77280 THER RAD SIMULAJ FIELD SMPL: CPT | Performed by: RADIOLOGY

## 2020-03-11 PROCEDURE — 77412 RADIATION TX DELIVERY LVL 3: CPT | Performed by: RADIOLOGY

## 2020-03-12 PROCEDURE — 77387 GUIDANCE FOR RADJ TX DLVR: CPT | Performed by: RADIOLOGY

## 2020-03-12 PROCEDURE — 77412 RADIATION TX DELIVERY LVL 3: CPT | Performed by: RADIOLOGY

## 2020-03-13 PROCEDURE — 77387 GUIDANCE FOR RADJ TX DLVR: CPT | Performed by: RADIOLOGY

## 2020-03-13 PROCEDURE — 77336 RADIATION PHYSICS CONSULT: CPT | Performed by: RADIOLOGY

## 2020-03-13 PROCEDURE — 77412 RADIATION TX DELIVERY LVL 3: CPT | Performed by: RADIOLOGY

## 2020-03-16 ENCOUNTER — OFFICE VISIT (OUTPATIENT)
Dept: RADIATION ONCOLOGY | Facility: HOSPITAL | Age: 55
End: 2020-03-16
Attending: RADIOLOGY
Payer: COMMERCIAL

## 2020-03-16 VITALS
TEMPERATURE: 99 F | SYSTOLIC BLOOD PRESSURE: 109 MMHG | OXYGEN SATURATION: 99 % | RESPIRATION RATE: 15 BRPM | DIASTOLIC BLOOD PRESSURE: 78 MMHG | HEART RATE: 62 BPM

## 2020-03-16 PROCEDURE — 77412 RADIATION TX DELIVERY LVL 3: CPT | Performed by: RADIOLOGY

## 2020-03-16 PROCEDURE — 77387 GUIDANCE FOR RADJ TX DLVR: CPT | Performed by: RADIOLOGY

## 2020-03-16 NOTE — PROGRESS NOTES
[facility] Radiation Treatment Management Note 21-25    Patient:  Jarocho Hernández  Age:  47year old  Visit Diagnosis:  No diagnosis found.   Primary Rad/Onc:  Dr. Dawson Amin Mccarty    Site Delivered Dose (Gy) Prescribed Dose (Gy) Fraction #

## 2020-03-16 NOTE — PROGRESS NOTES
RADIATION ONCOLOGY COMPLETION SUMMARY NOTE    DIAGNOSIS: Stage 0 TisNOMO, DCIS of right breast, s/p lumpectomy, and now s/p adjuvant XRT on 3/16/2020. Dear Jazzmine Sen and colleagues,     As you recall, Mrs. Johnnie Gomez is a pleasant 47 yea expected side effects of grade 1-2 dermatitis and we expect the acute skin reactions to take 1-2 weeks to heal.      Pt will continue to follow closely with Dr. Mckayla Shrestha and Eleazar Ruiz long term.     As the patient is doing well, I will see patient in  3-4 months fo

## 2020-03-16 NOTE — PATIENT INSTRUCTIONS
POST-RADIATION INSTRUCTIONS:   - [facility] [phone] FOR A FOLLOW-UP WITH DR. WHITE IN THREE MONTHS   - PLEASE CALL IN ONE WEEK TO UPDATE THE NURSE REGARDING YOUR SKIN RASH  - SIDE EFFECTS OF RADIATION WILL GRADUALLY SUBSIDE, IT MAY TAKE UP TO 2 WEEKS POST-RADI

## 2020-03-20 PROCEDURE — 77336 RADIATION PHYSICS CONSULT: CPT | Performed by: RADIOLOGY

## 2020-03-26 NOTE — PROGRESS NOTES
Breast Surgery Post-Operative Visit    Diagnosis: Right breast DCIS status post right breast bracketed lumpectomy on January 13, 2020.     Stage: Tis NxMx    Disease Status:  Surgical treatment complete, medical and radiation oncology recommendations pendin cancer [facility]) 2019    right breast   • Gastritis    • Hx of hand surgery     Right hand surgery   • Internal hemorrhoids 1/30/2018   • MGD (meibomian gland dysfunction) 2009    OU   • Myopia of both eyes with astigmatism 2009    OU   • Ocular migraine 2009 cancer   • Stroke Maternal Grandfather         CVA   • Hypertension Paternal Grandmother    • Neurological Disorder Paternal Grandmother         Alzheimer's disease   • Heart Disease Paternal Grandfather         CAD   • Colon Cancer Maternal Great-Grandmot patient denies frequent urination, needing to get up at night to urinate, urinary hesitancy or retaining urine, painful urination, urinary incontinence, decreased urine stream, blood in the urine or vaginal/penile discharge.     Skin:    The patient denies margins. We discussed that though she only had 5% estrogen positivity I like her to meet with medical oncology to discuss the significant implications of this. We also discussed the need to meet with radiation oncology to augment local control.   I will n

## 2020-03-30 ENCOUNTER — VIRTUAL PHONE E/M (OUTPATIENT)
Dept: HEMATOLOGY/ONCOLOGY | Facility: HOSPITAL | Age: 55
End: 2020-03-30
Attending: INTERNAL MEDICINE
Payer: COMMERCIAL

## 2020-03-30 DIAGNOSIS — D05.11 BREAST NEOPLASM, TIS (DCIS), RIGHT: Primary | ICD-10-CM

## 2020-03-30 PROCEDURE — 99213 OFFICE O/P EST LOW 20 MIN: CPT | Performed by: INTERNAL MEDICINE

## 2020-03-30 RX ORDER — ANASTROZOLE 1 MG/1
1 TABLET ORAL DAILY
Qty: 30 TABLET | Refills: 6 | Status: SHIPPED | OUTPATIENT
Start: 2020-03-30 | End: 2020-09-29

## 2020-03-30 NOTE — PROGRESS NOTES
Virtual/Telephone Check-In    [address] [facility] verbally consents to a Virtual/Telephone Check-In service on 03/30/20. Patient understands and accepts financial responsibility for any deductible, co-insurance and/or co-pays associated with this service. versus 89.2% with tamoxifen; however, a subgroup analysis showed that anastrozole was not superior to tamoxifen in women older than 60 years. Given that the patient is 48 y/o, recommend chemoprophylaxis with 5 years of anastrozole.   --Given the patient's

## 2020-03-31 ENCOUNTER — APPOINTMENT (OUTPATIENT)
Dept: HEMATOLOGY/ONCOLOGY | Facility: HOSPITAL | Age: 55
End: 2020-03-31
Attending: INTERNAL MEDICINE
Payer: COMMERCIAL

## 2020-04-15 ENCOUNTER — TELEPHONE (OUTPATIENT)
Dept: HEMATOLOGY/ONCOLOGY | Facility: HOSPITAL | Age: 55
End: 2020-04-15

## 2020-04-28 ENCOUNTER — VIRTUAL PHONE E/M (OUTPATIENT)
Dept: HEMATOLOGY/ONCOLOGY | Facility: HOSPITAL | Age: 55
End: 2020-04-28
Attending: NURSE PRACTITIONER
Payer: COMMERCIAL

## 2020-04-28 DIAGNOSIS — D05.11 BREAST NEOPLASM, TIS (DCIS), RIGHT: ICD-10-CM

## 2020-04-28 DIAGNOSIS — Z71.9 COUNSELING, UNSPECIFIED: ICD-10-CM

## 2020-04-28 PROCEDURE — 99214 OFFICE O/P EST MOD 30 MIN: CPT | Performed by: NURSE PRACTITIONER

## 2020-04-28 NOTE — PROGRESS NOTES
Virtual Telephone Check-In    Roland Babin verbally consents to a Virtual/Telephone Check-In visit on 04/28/20. Patient understands and accepts financial responsibility for any deductible, co-insurance and/or co-pays associated with this service. care recommended for age and gender including cancer screening tests. She is up-to-date with all cancer screening. She was encouraged to continue to see specialists at usual intervals.      Reviewed concerning symptoms that she should report to any [employer], Living Beyond Breast Cancer, [employer], Facebook: Jamila 36     The patient was given the opportunity to ask questions. She had few questions and verbalized understanding of the information we discussed today.   She was encouraged to jordyn

## 2020-04-28 NOTE — PROGRESS NOTES
Virtual/Telephone Check-In    Mini Mancini verbally consents to a [employer] on 03/31/20. Patient understands and accepts financial responsibility for any deductible, co-insurance and/or co-pays associated with this service.     Duration of nutrition classes, exercise, stress management, spa services  -[address] [facility] in [facility] groups, special programs, nutrition and exercise classes, movement classes (all on-hold currently due to COVID-19; Virtual

## 2020-05-01 DIAGNOSIS — Z80.0 FAMILY HISTORY OF COLON CANCER: Primary | ICD-10-CM

## 2020-05-01 DIAGNOSIS — Z80.49 FAMILY HISTORY OF UTERINE CANCER: ICD-10-CM

## 2020-05-26 ENCOUNTER — OFFICE VISIT (OUTPATIENT)
Dept: DERMATOLOGY CLINIC | Facility: CLINIC | Age: 55
End: 2020-05-26
Payer: COMMERCIAL

## 2020-05-26 DIAGNOSIS — Z86.018 HISTORY OF DYSPLASTIC NEVUS: ICD-10-CM

## 2020-05-26 DIAGNOSIS — Z87.2 HISTORY OF ACTINIC KERATOSES: ICD-10-CM

## 2020-05-26 DIAGNOSIS — Z85.828 PERSONAL HISTORY OF SKIN CANCER: Primary | ICD-10-CM

## 2020-05-26 DIAGNOSIS — D23.4 BENIGN NEOPLASM OF SCALP AND SKIN OF NECK: ICD-10-CM

## 2020-05-26 DIAGNOSIS — D23.30 BENIGN NEOPLASM OF SKIN OF FACE: ICD-10-CM

## 2020-05-26 DIAGNOSIS — R58 ECCHYMOSIS: ICD-10-CM

## 2020-05-26 DIAGNOSIS — D23.60 BENIGN NEOPLASM OF SKIN OF UPPER LIMB, INCLUDING SHOULDER, UNSPECIFIED LATERALITY: ICD-10-CM

## 2020-05-26 DIAGNOSIS — L82.1 SEBORRHEIC KERATOSES: ICD-10-CM

## 2020-05-26 DIAGNOSIS — L57.8 SUN-DAMAGED SKIN: ICD-10-CM

## 2020-05-26 DIAGNOSIS — D23.5 BENIGN NEOPLASM OF SKIN OF TRUNK, EXCEPT SCROTUM: ICD-10-CM

## 2020-05-26 PROCEDURE — 99213 OFFICE O/P EST LOW 20 MIN: CPT | Performed by: DERMATOLOGY

## 2020-05-26 PROCEDURE — 99212 OFFICE O/P EST SF 10 MIN: CPT | Performed by: DERMATOLOGY

## 2020-05-26 NOTE — PROGRESS NOTES
HPI:     Chief Complaint     Upper Body Exam        HPI     Upper Body Exam      Additional comments: LOV 10/7 2019 Patient present for upper body skin exam . Patient has hx of SCC          Last edited by Lumora, [address] on 5/26/2020  2:33 PM. (Histor of left medial ankle   • Breast cancer ([facility] [address] 2019    right breast   • Gastritis    • Hx of hand surgery     Right hand surgery   • Internal hemorrhoids 1/30/2018   • MGD (meibomian gland dysfunction) 2009    OU   • Myopia of both eyes with astigmatism 2009 Days per week: Not on file        Minutes per session: Not on file      Stress: Not on file    Relationships      Social connections:        Talks on phone: Not on file        Gets together: Not on file        Attends Shinto service: Not on file EXAM:   An upper body exam was performed, including face, neck, chest, back, abdomen, r upper extremity, l upper extremity, and scalp    The patient is alert, oriented and appears their stated age. The patient is in no distress and is well nourished.     E upper limb, including shoulder, unspecified laterality    No orders of the defined types were placed in this encounter. Results From Past 48 Hours:  No results found for this or any previous visit (from the past 48 hour(s)).     Meds This Visit:      I

## 2020-06-24 ENCOUNTER — TELEPHONE (OUTPATIENT)
Dept: RADIATION ONCOLOGY | Facility: HOSPITAL | Age: 55
End: 2020-06-24

## 2020-06-25 ENCOUNTER — TELEPHONE (OUTPATIENT)
Dept: RADIATION ONCOLOGY | Facility: HOSPITAL | Age: 55
End: 2020-06-25

## 2020-06-26 ENCOUNTER — TELEPHONE (OUTPATIENT)
Dept: RADIATION ONCOLOGY | Facility: HOSPITAL | Age: 55
End: 2020-06-26

## 2020-07-22 ENCOUNTER — OFFICE VISIT (OUTPATIENT)
Dept: RADIATION ONCOLOGY | Facility: HOSPITAL | Age: 55
End: 2020-07-22
Attending: RADIOLOGY
Payer: COMMERCIAL

## 2020-07-22 VITALS
DIASTOLIC BLOOD PRESSURE: 72 MMHG | HEART RATE: 69 BPM | RESPIRATION RATE: 16 BRPM | SYSTOLIC BLOOD PRESSURE: 106 MMHG | TEMPERATURE: 98 F

## 2020-07-22 PROCEDURE — 99211 OFF/OP EST MAY X REQ PHY/QHP: CPT

## 2020-07-22 NOTE — PROGRESS NOTES
Pt seen in follow up with Dr Cristino Galeano, having completed radiation to the R breast 3/16/20 DCIS. Taking Anastrozole and tolerating well. Mild occasional hot flashes. Bilateral mammogram scheduled for 8/25. Follow up with Dr Farrah Ji 9/15.  Pt very active, states has li

## 2020-07-22 NOTE — PROGRESS NOTES
RADIATION ONCOLOGY NOTE    DATE OF VISIT: 7/22/2020    DIAGNOSIS: Stage 0 TisNOMO, DCIS of right breast, s/p lumpectomy, and now s/p adjuvant XRT on 3/16/2020, doing well on adjuvant anastrazole.      Dear Jazzmine Wade and colleagues,     As you recall, Take 1 tablet (1 mg total) by mouth daily. 30 tablet 6   • mometasone 0.1 % External Ointment Apply 2 Application topically daily. 45 g 1   • Flaxseed, Linseed, (FLAX SEED OIL OR) Take by mouth. • Ibuprofen (ADVIL) 200 MG Oral Cap Take by mouth.      • family history includes Breast Cancer (age of onset: 67) in her mother; Colon Cancer (age of onset: 21) in her maternal great-grandmother; Colon Cancer (age of onset: 61) in her maternal grandmother; Heart Disease in her paternal grandfather; [facility]  [email or web address]  [phone]    7/22/2020

## 2020-09-17 ENCOUNTER — HOSPITAL ENCOUNTER (OUTPATIENT)
Dept: MAMMOGRAPHY | Facility: HOSPITAL | Age: 55
Discharge: HOME | End: 2020-09-17
Attending: SURGERY
Payer: COMMERCIAL

## 2020-09-17 DIAGNOSIS — D05.11 BREAST NEOPLASM, TIS (DCIS), RIGHT: ICD-10-CM

## 2020-09-17 PROCEDURE — 77066 DX MAMMO INCL CAD BI: CPT | Performed by: SURGERY

## 2020-09-17 PROCEDURE — 77062 BREAST TOMOSYNTHESIS BI: CPT | Performed by: SURGERY

## 2020-09-29 ENCOUNTER — OFFICE VISIT (OUTPATIENT)
Dept: HEMATOLOGY/ONCOLOGY | Facility: HOSPITAL | Age: 55
End: 2020-09-29
Attending: GENETIC COUNSELOR, MS
Payer: COMMERCIAL

## 2020-09-29 VITALS
RESPIRATION RATE: 16 BRPM | TEMPERATURE: 98 F | BODY MASS INDEX: 29.41 KG/M2 | SYSTOLIC BLOOD PRESSURE: 112 MMHG | OXYGEN SATURATION: 97 % | DIASTOLIC BLOOD PRESSURE: 72 MMHG | HEART RATE: 74 BPM | WEIGHT: 183 LBS | HEIGHT: 66 IN

## 2020-09-29 DIAGNOSIS — Z79.811 ENCOUNTER FOR MONITORING AROMATASE INHIBITOR THERAPY: ICD-10-CM

## 2020-09-29 DIAGNOSIS — D05.11 BREAST NEOPLASM, TIS (DCIS), RIGHT: Primary | ICD-10-CM

## 2020-09-29 DIAGNOSIS — Z51.81 ENCOUNTER FOR MONITORING AROMATASE INHIBITOR THERAPY: ICD-10-CM

## 2020-09-29 PROCEDURE — 99214 OFFICE O/P EST MOD 30 MIN: CPT | Performed by: INTERNAL MEDICINE

## 2020-09-29 RX ORDER — ANASTROZOLE 1 MG/1
1 TABLET ORAL DAILY
Qty: 30 TABLET | Refills: 6 | Status: SHIPPED | OUTPATIENT
Start: 2020-09-29 | End: 2021-04-05

## 2020-09-29 NOTE — PROGRESS NOTES
HPI   Jarocho Hernández is a 54year old female here for f/u of Breast neoplasm, tis (dcis), right  (primary encounter diagnosis)  Encounter for monitoring aromatase inhibitor therapy    Compliance with SBE: Yes.  Changes on SBE: No.   Compliance with An 2009    OU   • Myopia of both eyes with astigmatism 2009    OU   • Ocular migraine 2009    OS   • S/P colonoscopic polypectomy 1/30/2018   • SCCA (squamous cell carcinoma) of skin 2015 2015   • Seasonal allergies    • Squamous carcinoma 2015    left for Ht 1.676 m (5' 6\")   Wt 83 kg (183 lb)   LMP 08/02/2017 (Within Days)   SpO2 97%   BMI 29.54 kg/m²   Wt Readings from Last 6 Encounters:  09/29/20 : 83 kg (183 lb)  02/07/20 : 84.4 kg (186 lb)  01/22/20 : 86.6 kg (191 lb)  01/13/20 : 84.4 kg (186 lb)  12 (FIU=[id number])     COMPARISON: [facility], [facility] SURGICAL SPECIMEN RIGHT EM (GDA=[id number]), 1/13/2020, 1:43 PM.     INDICATIONS:  Intraductal carcinoma in situ of right breast     TECHNIQUE:    Digital diagnostic mammography was performed and patient's next mammogram has been entered into a reminder system.        Patient received a discharge summary from the technologist after completion of exam.     Breast marker legend used on images    Triangle = Palpable lump  [place] = Skin tag or mole  BB

## 2020-10-05 ENCOUNTER — GENETICS ENCOUNTER (OUTPATIENT)
Dept: GENETICS | Facility: HOSPITAL | Age: 55
End: 2020-10-05
Attending: GENETIC COUNSELOR, MS
Payer: COMMERCIAL

## 2020-10-05 DIAGNOSIS — D05.11 BREAST NEOPLASM, TIS (DCIS), RIGHT: Primary | ICD-10-CM

## 2020-10-05 DIAGNOSIS — Z80.0 FAMILY HISTORY OF COLON CANCER: ICD-10-CM

## 2020-10-05 PROCEDURE — 96040 HC GENETIC COUNSELING EA 30 MIN: CPT | Performed by: GENETIC COUNSELOR, MS

## 2020-10-05 NOTE — PROGRESS NOTES
Reason for visit: Mrs. Ed Yuen is a 70-year-old woman referred to genetic counseling due to her [facility] diagnosis of breast cancer as well as her family history of cancer.   She was referred to discuss the option of genetic testing and how this may help with metastatic colorectal cancer at age 79 and her maternal great-grandmother (grandmother’s mother) passed away from colon cancer in her 21’s.   She is not aware of other malignancies on this side of the family, but does admit to limited information for a numb female offspring. The pros and cons of cancer predisposing gene mutation testing were reviewed with Mrs. Baptiste.   Genetic test results can have significant impact on medical management, planning, screening, surgical decision-making, cancer risk assessment discussion of genetic testing options, Mrs. Saadia Desir would like to consider her testing options and plans to let me know in the future if she wants to pursue genetic testing.   She is not overly concerned about the possibility of Villeda syndrome at this point

## 2020-10-27 ENCOUNTER — PATIENT MESSAGE (OUTPATIENT)
Dept: INTERNAL MEDICINE CLINIC | Facility: CLINIC | Age: 55
End: 2020-10-27

## 2020-12-08 ENCOUNTER — OFFICE VISIT (OUTPATIENT)
Dept: DERMATOLOGY CLINIC | Facility: CLINIC | Age: 55
End: 2020-12-08
Payer: COMMERCIAL

## 2020-12-08 DIAGNOSIS — L57.0 ACTINIC KERATOSIS: ICD-10-CM

## 2020-12-08 DIAGNOSIS — L82.1 SEBORRHEIC KERATOSES: ICD-10-CM

## 2020-12-08 DIAGNOSIS — D23.70 BENIGN NEOPLASM OF SKIN OF LOWER LIMB, INCLUDING HIP, UNSPECIFIED LATERALITY: ICD-10-CM

## 2020-12-08 DIAGNOSIS — D23.4 BENIGN NEOPLASM OF SCALP AND SKIN OF NECK: ICD-10-CM

## 2020-12-08 DIAGNOSIS — D23.60 BENIGN NEOPLASM OF SKIN OF UPPER LIMB, INCLUDING SHOULDER, UNSPECIFIED LATERALITY: ICD-10-CM

## 2020-12-08 DIAGNOSIS — Z86.018 HISTORY OF DYSPLASTIC NEVUS: ICD-10-CM

## 2020-12-08 DIAGNOSIS — L57.8 SUN-DAMAGED SKIN: ICD-10-CM

## 2020-12-08 DIAGNOSIS — Z85.828 PERSONAL HISTORY OF SKIN CANCER: Primary | ICD-10-CM

## 2020-12-08 DIAGNOSIS — D23.30 BENIGN NEOPLASM OF SKIN OF FACE: ICD-10-CM

## 2020-12-08 DIAGNOSIS — D23.5 BENIGN NEOPLASM OF SKIN OF TRUNK, EXCEPT SCROTUM: ICD-10-CM

## 2020-12-08 PROCEDURE — 99213 OFFICE O/P EST LOW 20 MIN: CPT | Performed by: DERMATOLOGY

## 2020-12-08 PROCEDURE — 17000 DESTRUCT PREMALG LESION: CPT | Performed by: DERMATOLOGY

## 2020-12-08 PROCEDURE — 17003 DESTRUCT PREMALG LES 2-14: CPT | Performed by: DERMATOLOGY

## 2020-12-08 RX ORDER — IBUPROFEN 600 MG/1
TABLET ORAL
COMMUNITY
Start: 2020-09-30 | End: 2021-03-29

## 2020-12-08 RX ORDER — CHLORHEXIDINE GLUCONATE 0.12 MG/ML
RINSE ORAL
COMMUNITY
Start: 2020-09-30 | End: 2021-10-05 | Stop reason: ALTCHOICE

## 2020-12-08 NOTE — PROGRESS NOTES
HPI:     Chief Complaint     Full Skin Exam        HPI     Full Skin Exam      Additional comments: LOV 5/26/20. pt presenting today with full body skin check.  pt has HX of AK's,SCC          Last edited by BOI Cardozo on 12/8/2020 11:37 AM. (Hist Cap Take by mouth. • Fexofenadine HCl (ALLEGRA) 180 MG Oral Tab Take  by mouth.        Allergies:     [place]                  HIVES    Comment:Adhesive Glue - rash    Past Medical History:   Diagnosis Date   • Anemia, iron deficiency    • Atypical nevus file        Non-medical: Not on file    Tobacco Use      Smoking status: Never Smoker      Smokeless tobacco: Never Used    Substance and Sexual Activity      Alcohol use: Yes        Comment: Weekly Wine 5 drinks;       Drug use: Never      Sexual activity Neurological Disorder Paternal Grandmother         Alzheimer's disease   • Heart Disease Paternal Grandfather         CAD   • Colon Cancer Maternal Great-Grandmother 21        in her 19's and  of disease   • Other (possible hx of skin cancer) Brother given  History of dysplastic nevus-ABCDE's of melanoma discussed. the patient is to follow up for upper body check in 6 months and we will do yearly full body checks. . Monthly self exams.  The patient will come sooner should they note  anything new or benito

## 2021-01-28 PROBLEM — M25.571 PAIN IN JOINT, ANKLE AND FOOT, RIGHT: Status: ACTIVE | Noted: 2021-01-28

## 2021-02-04 PROBLEM — M19.079: Status: ACTIVE | Noted: 2021-02-04

## 2021-02-04 PROBLEM — M79.671 RIGHT FOOT PAIN: Status: ACTIVE | Noted: 2021-02-04

## 2021-02-15 PROBLEM — M19.071 OSTEOARTHRITIS OF FIRST METATARSOPHALANGEAL (MTP) JOINT OF RIGHT FOOT: Status: ACTIVE | Noted: 2021-02-15

## 2021-03-09 ENCOUNTER — HOSPITAL ENCOUNTER (OUTPATIENT)
Dept: MAMMOGRAPHY | Facility: HOSPITAL | Age: 56
Discharge: HOME | End: 2021-03-09
Attending: INTERNAL MEDICINE
Payer: COMMERCIAL

## 2021-03-09 DIAGNOSIS — D05.11 BREAST NEOPLASM, TIS (DCIS), RIGHT: ICD-10-CM

## 2021-03-09 PROCEDURE — 77061 BREAST TOMOSYNTHESIS UNI: CPT | Performed by: INTERNAL MEDICINE

## 2021-03-09 PROCEDURE — 77065 DX MAMMO INCL CAD UNI: CPT | Performed by: INTERNAL MEDICINE

## 2021-03-29 PROBLEM — M19.079: Status: RESOLVED | Noted: 2021-02-04 | Resolved: 2021-03-29

## 2021-03-29 PROBLEM — Z83.719 FAMILY HISTORY OF COLONIC POLYPS: Status: RESOLVED | Noted: 2018-01-16 | Resolved: 2021-03-29

## 2021-03-29 PROBLEM — L57.8 SUN-DAMAGED SKIN: Status: RESOLVED | Noted: 2017-12-07 | Resolved: 2021-03-29

## 2021-03-29 PROBLEM — M79.671 RIGHT FOOT PAIN: Status: RESOLVED | Noted: 2021-02-04 | Resolved: 2021-03-29

## 2021-03-29 PROBLEM — K64.8 INTERNAL HEMORRHOIDS: Status: RESOLVED | Noted: 2018-01-30 | Resolved: 2021-03-29

## 2021-03-29 PROBLEM — Z83.71 FAMILY HISTORY OF COLONIC POLYPS: Status: RESOLVED | Noted: 2018-01-16 | Resolved: 2021-03-29

## 2021-03-29 PROBLEM — Z98.890 S/P COLONOSCOPIC POLYPECTOMY: Status: RESOLVED | Noted: 2018-01-30 | Resolved: 2021-03-29

## 2021-03-29 PROBLEM — L57.0 ACTINIC KERATOSIS: Status: RESOLVED | Noted: 2017-04-04 | Resolved: 2021-03-29

## 2021-03-29 PROBLEM — L91.0 KELOID SCAR: Status: RESOLVED | Noted: 2018-10-01 | Resolved: 2021-03-29

## 2021-03-30 ENCOUNTER — OFFICE VISIT (OUTPATIENT)
Dept: INTERNAL MEDICINE CLINIC | Facility: CLINIC | Age: 56
End: 2021-03-30
Payer: COMMERCIAL

## 2021-03-30 ENCOUNTER — APPOINTMENT (OUTPATIENT)
Dept: HEMATOLOGY/ONCOLOGY | Facility: HOSPITAL | Age: 56
End: 2021-03-30
Attending: GENETIC COUNSELOR, MS
Payer: COMMERCIAL

## 2021-03-30 VITALS
HEIGHT: 66 IN | HEART RATE: 67 BPM | WEIGHT: 181.81 LBS | SYSTOLIC BLOOD PRESSURE: 110 MMHG | BODY MASS INDEX: 29.22 KG/M2 | DIASTOLIC BLOOD PRESSURE: 71 MMHG

## 2021-03-30 DIAGNOSIS — Z00.00 ROUTINE GENERAL MEDICAL EXAMINATION AT A HEALTH CARE FACILITY: Primary | ICD-10-CM

## 2021-03-30 PROBLEM — M25.571 PAIN IN JOINT, ANKLE AND FOOT, RIGHT: Status: RESOLVED | Noted: 2021-01-28 | Resolved: 2021-03-30

## 2021-03-30 PROBLEM — L82.0 INFLAMED SEBORRHEIC KERATOSIS: Status: RESOLVED | Noted: 2019-10-07 | Resolved: 2021-03-30

## 2021-03-30 PROBLEM — S52.126D CLOSED NONDISPLACED FRACTURE OF HEAD OF RADIUS WITH ROUTINE HEALING: Status: RESOLVED | Noted: 2019-05-23 | Resolved: 2021-03-30

## 2021-03-30 PROCEDURE — 99396 PREV VISIT EST AGE 40-64: CPT | Performed by: INTERNAL MEDICINE

## 2021-03-30 PROCEDURE — 3008F BODY MASS INDEX DOCD: CPT | Performed by: INTERNAL MEDICINE

## 2021-03-30 PROCEDURE — 3078F DIAST BP <80 MM HG: CPT | Performed by: INTERNAL MEDICINE

## 2021-03-30 PROCEDURE — 3074F SYST BP LT 130 MM HG: CPT | Performed by: INTERNAL MEDICINE

## 2021-03-30 NOTE — PROGRESS NOTES
HPI:    Patient ID: Tanya Groves is a 54year old female.     HPI    Phys exam  Due for labs     Hx of breast CA DCIS  Followed by DR Mahnaz Powers and DR Rocky balbuenaa per dr Liliana Tobar  Last pap 2019  Ongoing follow up with derm hx actinic keratosis and o MOUTH DAILY WITH FOOD 30 tablet 3   • anastrozole 1 MG Oral Tab tab Take 1 tablet (1 mg total) by mouth daily. 30 tablet 6   • Flaxseed, Linseed, (FLAX SEED OIL OR) Take by mouth. • Ibuprofen (ADVIL) 200 MG Oral Cap Take by mouth.      • KRILL OIL OR Ta Problem Relation Age of Onset   • Breast Cancer Mother 67   • Colon Cancer Maternal Grandmother 79        Rectal cancer   • Stroke Maternal Grandfather         CVA   • Hypertension Paternal Grandmother    • Neurological Disorder Paternal Grandmother is no mass. Tenderness: There is no abdominal tenderness. Hernia: No hernia is present. Musculoskeletal:         General: No tenderness. Cervical back: Neck supple. Lymphadenopathy:      Cervical: No cervical adenopathy.    Skin:     Gene

## 2021-04-05 ENCOUNTER — LAB ENCOUNTER (OUTPATIENT)
Dept: LAB | Facility: HOSPITAL | Age: 56
End: 2021-04-05
Attending: INTERNAL MEDICINE
Payer: COMMERCIAL

## 2021-04-05 ENCOUNTER — OFFICE VISIT (OUTPATIENT)
Dept: HEMATOLOGY/ONCOLOGY | Facility: HOSPITAL | Age: 56
End: 2021-04-05
Attending: INTERNAL MEDICINE
Payer: COMMERCIAL

## 2021-04-05 VITALS
RESPIRATION RATE: 16 BRPM | BODY MASS INDEX: 29.25 KG/M2 | DIASTOLIC BLOOD PRESSURE: 79 MMHG | SYSTOLIC BLOOD PRESSURE: 123 MMHG | WEIGHT: 182 LBS | HEIGHT: 66 IN | HEART RATE: 62 BPM | OXYGEN SATURATION: 98 % | TEMPERATURE: 98 F

## 2021-04-05 DIAGNOSIS — Z00.00 ROUTINE GENERAL MEDICAL EXAMINATION AT A HEALTH CARE FACILITY: ICD-10-CM

## 2021-04-05 DIAGNOSIS — Z51.81 ENCOUNTER FOR MONITORING AROMATASE INHIBITOR THERAPY: ICD-10-CM

## 2021-04-05 DIAGNOSIS — Z79.811 ENCOUNTER FOR MONITORING AROMATASE INHIBITOR THERAPY: ICD-10-CM

## 2021-04-05 DIAGNOSIS — D05.11 BREAST NEOPLASM, TIS (DCIS), RIGHT: Primary | ICD-10-CM

## 2021-04-05 PROCEDURE — 81015 MICROSCOPIC EXAM OF URINE: CPT

## 2021-04-05 PROCEDURE — 85027 COMPLETE CBC AUTOMATED: CPT | Performed by: INTERNAL MEDICINE

## 2021-04-05 PROCEDURE — 83036 HEMOGLOBIN GLYCOSYLATED A1C: CPT | Performed by: INTERNAL MEDICINE

## 2021-04-05 PROCEDURE — 80061 LIPID PANEL: CPT | Performed by: INTERNAL MEDICINE

## 2021-04-05 PROCEDURE — 84443 ASSAY THYROID STIM HORMONE: CPT | Performed by: INTERNAL MEDICINE

## 2021-04-05 PROCEDURE — 36415 COLL VENOUS BLD VENIPUNCTURE: CPT | Performed by: INTERNAL MEDICINE

## 2021-04-05 PROCEDURE — 80053 COMPREHEN METABOLIC PANEL: CPT | Performed by: INTERNAL MEDICINE

## 2021-04-05 PROCEDURE — 84439 ASSAY OF FREE THYROXINE: CPT | Performed by: INTERNAL MEDICINE

## 2021-04-05 PROCEDURE — 99214 OFFICE O/P EST MOD 30 MIN: CPT | Performed by: INTERNAL MEDICINE

## 2021-04-05 RX ORDER — ANASTROZOLE 1 MG/1
1 TABLET ORAL DAILY
Qty: 90 TABLET | Refills: 3 | Status: SHIPPED | OUTPATIENT
Start: 2021-04-05 | End: 2022-03-31

## 2021-04-05 NOTE — PROGRESS NOTES
HPI   Esperanza Lanza is a 54year old female here for f/u of Breast neoplasm, tis (dcis), right  (primary encounter diagnosis)  Encounter for monitoring aromatase inhibitor therapy    Compliance with SBE: Yes.  Changes on SBE: No.   Compliance with An Comment:Adhesive Glue - rash    Past Medical History:   Diagnosis Date   • Anemia, iron deficiency    • Atypical nevus 2001    skin of left medial ankle   • Breast cancer ([facility] [address] 2019    right breast   • Gastritis    • Hx of hand surgery     Right hand surge Disease Paternal Grandfather         CAD   • Colon Cancer Maternal Great-Grandmother 21        in her 19's and  of disease   • Other (possible hx of skin cancer) Brother    • Uterine Cancer Maternal Aunt 79   • Breast Cancer Self 54   • Squamous Cell S Order given. --Will follow up in 6 months.     --Baseline DEXA was normal it was done February 2020. Will have a repeat to assess her bone health in February 2022.        MDM moderate. No orders of the defined types were placed in this encounter. patient be evaluated in our [address] [place] [address] to determine   eligibility for additional breast cancer screening, risk reduction strategies and/or genetic testing.  Providers are encouraged to contact our breast navigator, Max Sheehan, at ([phone]

## 2021-06-08 ENCOUNTER — OFFICE VISIT (OUTPATIENT)
Dept: DERMATOLOGY CLINIC | Facility: CLINIC | Age: 56
End: 2021-06-08
Payer: COMMERCIAL

## 2021-06-08 DIAGNOSIS — Z85.828 PERSONAL HISTORY OF SKIN CANCER: Primary | ICD-10-CM

## 2021-06-08 DIAGNOSIS — D18.01 HEMANGIOMA OF SKIN AND SUBCUTANEOUS TISSUE: ICD-10-CM

## 2021-06-08 DIAGNOSIS — L57.0 ACTINIC KERATOSIS: ICD-10-CM

## 2021-06-08 DIAGNOSIS — D48.5 NEOPLASM OF UNCERTAIN BEHAVIOR OF SKIN: ICD-10-CM

## 2021-06-08 DIAGNOSIS — L82.1 SEBORRHEIC KERATOSES: ICD-10-CM

## 2021-06-08 DIAGNOSIS — L57.8 SUN-DAMAGED SKIN: ICD-10-CM

## 2021-06-08 DIAGNOSIS — Z86.018 HISTORY OF DYSPLASTIC NEVUS: ICD-10-CM

## 2021-06-08 DIAGNOSIS — D22.9 MULTIPLE MELANOCYTIC NEVI: ICD-10-CM

## 2021-06-08 PROBLEM — Z12.83 SKIN CANCER SCREENING: Status: ACTIVE | Noted: 2021-06-08

## 2021-06-08 PROCEDURE — 11102 TANGNTL BX SKIN SINGLE LES: CPT | Performed by: DERMATOLOGY

## 2021-06-08 PROCEDURE — 99213 OFFICE O/P EST LOW 20 MIN: CPT | Performed by: DERMATOLOGY

## 2021-06-08 NOTE — PROGRESS NOTES
HPI:     Chief Complaint     Upper Body Exam        HPI     Upper Body Exam      Additional comments: LOV 12/8/20. pt presenting today with upper body skin check.  pt has HX of AK's,Dysplastic Nevus,SCC          Last edited by OBI Beltran on 6/8/2 Medical History:   Diagnosis Date   • Anemia, iron deficiency    • Atypical nevus 2001    skin of left medial ankle   • Breast cancer ([facility] [address] 2019    right breast   • Gastritis    • Hx of hand surgery     Right hand surgery   • Internal hemorrhoids 1/30/2018 Asked        Hobby Hazards: Not Asked        Sleep Concern: Not Asked        Stress Concern: Not Asked        Weight Concern: Not Asked        Special Diet: Not Asked        Back Care: Not Asked        Exercise: Not Asked        Bike Helmet: Not Asked skin cancer) Brother    • Uterine Cancer Maternal Aunt 79   • Breast Cancer Self 54   • Squamous Cell Self 49        arm   • Cancer Paternal Uncle         unknown type       PHYSICAL EXAM:   Physical Exam  A complete body exam was performed, including face treated. Seborrheic keratoses-reassurance–no treatment  History of dysplastic nevus-ABCDE's of melanoma discussed. the patient is to follow up yearly. Monthly self exams. The patient will come sooner should they note  anything new or changing.   Proper sun

## 2021-09-15 ENCOUNTER — HOSPITAL ENCOUNTER (OUTPATIENT)
Dept: MAMMOGRAPHY | Facility: HOSPITAL | Age: 56
Discharge: HOME | End: 2021-09-15
Attending: INTERNAL MEDICINE
Payer: COMMERCIAL

## 2021-09-15 DIAGNOSIS — D05.11 BREAST NEOPLASM, TIS (DCIS), RIGHT: ICD-10-CM

## 2021-09-15 PROCEDURE — 77062 BREAST TOMOSYNTHESIS BI: CPT | Performed by: INTERNAL MEDICINE

## 2021-09-15 PROCEDURE — 77066 DX MAMMO INCL CAD BI: CPT | Performed by: INTERNAL MEDICINE

## 2021-09-21 ENCOUNTER — OFFICE VISIT (OUTPATIENT)
Dept: DERMATOLOGY CLINIC | Facility: CLINIC | Age: 56
End: 2021-09-21
Payer: COMMERCIAL

## 2021-09-21 DIAGNOSIS — L57.0 ACTINIC KERATOSIS: Primary | ICD-10-CM

## 2021-09-21 PROCEDURE — 17003 DESTRUCT PREMALG LES 2-14: CPT | Performed by: DERMATOLOGY

## 2021-09-21 PROCEDURE — 17000 DESTRUCT PREMALG LESION: CPT | Performed by: DERMATOLOGY

## 2021-09-21 NOTE — PROGRESS NOTES
HPI:     Chief Complaint     Actinic Keratosis        HPI     Actinic Keratosis      Additional comments: LOV 6/8/21. pt presenting today with AK f/u to nose. Not treated at previous visit. LSS to reasses.  pt has HX of Dysplastic Nevus,SCC          Last ed carcinoma 2015    left forearm, invasive, well diff.  arising with preexisting verruca     Past Surgical History:   Procedure Laterality Date   • COLONOSCOPY  2010   • COLONOSCOPY N/A 1/30/2018    Procedure: COLONOSCOPY;  Surgeon: Bi Monge Narrative      Not on file    Social Determinants of Health  Financial Resource Strain:       Difficulty of Paying Living Expenses: Not on file  Food Insecurity:       Worried About Running Out of Food in the Last Year: Not on file      Ran Out of Food in EXAM:   Patient is alert and oriented and appears their stated age. They are well-nourished. Exam is remarkable for the following-  1.   There are 2 areas of erythema keratotic scaling on the nose 1 by the bridge of the nose and one at the upper dorsal no

## 2021-10-05 ENCOUNTER — OFFICE VISIT (OUTPATIENT)
Dept: HEMATOLOGY/ONCOLOGY | Facility: HOSPITAL | Age: 56
End: 2021-10-05
Attending: INTERNAL MEDICINE
Payer: COMMERCIAL

## 2021-10-05 VITALS
HEART RATE: 60 BPM | RESPIRATION RATE: 18 BRPM | OXYGEN SATURATION: 98 % | SYSTOLIC BLOOD PRESSURE: 117 MMHG | WEIGHT: 181 LBS | DIASTOLIC BLOOD PRESSURE: 74 MMHG | HEIGHT: 66 IN | TEMPERATURE: 99 F | BODY MASS INDEX: 29.09 KG/M2

## 2021-10-05 DIAGNOSIS — Z78.0 POST-MENOPAUSAL: ICD-10-CM

## 2021-10-05 DIAGNOSIS — T45.1X5A HOT FLASHES RELATED TO AROMATASE INHIBITOR THERAPY: ICD-10-CM

## 2021-10-05 DIAGNOSIS — R23.2 HOT FLASHES RELATED TO AROMATASE INHIBITOR THERAPY: ICD-10-CM

## 2021-10-05 DIAGNOSIS — D05.11 BREAST NEOPLASM, TIS (DCIS), RIGHT: Primary | ICD-10-CM

## 2021-10-05 DIAGNOSIS — Z51.81 ENCOUNTER FOR MONITORING AROMATASE INHIBITOR THERAPY: ICD-10-CM

## 2021-10-05 DIAGNOSIS — Z79.811 ENCOUNTER FOR MONITORING AROMATASE INHIBITOR THERAPY: ICD-10-CM

## 2021-10-05 PROCEDURE — 99214 OFFICE O/P EST MOD 30 MIN: CPT | Performed by: INTERNAL MEDICINE

## 2021-10-05 RX ORDER — VENLAFAXINE HYDROCHLORIDE 37.5 MG/1
37.5 CAPSULE, EXTENDED RELEASE ORAL DAILY
Qty: 30 CAPSULE | Refills: 6 | Status: SHIPPED | OUTPATIENT
Start: 2021-10-05 | End: 2022-05-03

## 2021-10-05 NOTE — PROGRESS NOTES
KIKE   Ale Mederos is a 64year old female here for f/u of Breast neoplasm, tis (dcis), right  (primary encounter diagnosis)  Encounter for monitoring aromatase inhibitor therapy  Post-menopausal  Hot flashes related to aromatase inhibitor therapy perifollicular fibrosis suggestive of ruptured cyst   • Anemia, iron deficiency    • Atypical nevus 2001    skin of left medial ankle   • Breast cancer ([facility] [address] 2019    right breast   • Gastritis    • Hx of hand surgery     Right hand surgery   • Internal hem Cancer Maternal Aunt 70   • Breast Cancer Self 54   • Squamous Cell Self 49        arm   • Cancer Paternal Uncle         unknown type         PHYSICAL EXAM:    /74 (BP Location: Left arm, Patient Position: Sitting, Cuff Size: large)   Pulse 60   Temp mammogram..     --Baseline DEXA was normal it was done February 2020. Will have a repeat to assess her bone health in February 2022. Order given        MDM moderate. No orders of the defined types were placed in this encounter.       Results From Past A biopsy clip is seen in the upper outer left breast.  No new suspicious mass, calcification or distortion left breast.            Impression  CONCLUSION:       Stable post lumpectomy changes right breast.  Recommend six-month follow-up right diagnostic ma

## 2021-10-15 ENCOUNTER — HOSPITAL ENCOUNTER (EMERGENCY)
Facility: HOSPITAL | Age: 56
Discharge: HOME | End: 2021-10-15
Payer: COMMERCIAL

## 2021-10-15 ENCOUNTER — NURSE TRIAGE (OUTPATIENT)
Dept: INTERNAL MEDICINE CLINIC | Facility: CLINIC | Age: 56
End: 2021-10-15

## 2021-10-15 VITALS
HEART RATE: 78 BPM | SYSTOLIC BLOOD PRESSURE: 121 MMHG | BODY MASS INDEX: 28.45 KG/M2 | OXYGEN SATURATION: 99 % | RESPIRATION RATE: 18 BRPM | TEMPERATURE: 98 F | WEIGHT: 177 LBS | HEIGHT: 66 IN | DIASTOLIC BLOOD PRESSURE: 77 MMHG

## 2021-10-15 DIAGNOSIS — R11.2 NAUSEA AND VOMITING IN ADULT: Primary | ICD-10-CM

## 2021-10-15 PROCEDURE — 96374 THER/PROPH/DIAG INJ IV PUSH: CPT

## 2021-10-15 PROCEDURE — 96361 HYDRATE IV INFUSION ADD-ON: CPT

## 2021-10-15 PROCEDURE — 80048 BASIC METABOLIC PNL TOTAL CA: CPT | Performed by: NURSE PRACTITIONER

## 2021-10-15 PROCEDURE — 96375 TX/PRO/DX INJ NEW DRUG ADDON: CPT

## 2021-10-15 PROCEDURE — 99284 EMERGENCY DEPT VISIT MOD MDM: CPT

## 2021-10-15 PROCEDURE — 85025 COMPLETE CBC W/AUTO DIFF WBC: CPT | Performed by: NURSE PRACTITIONER

## 2021-10-15 PROCEDURE — 80076 HEPATIC FUNCTION PANEL: CPT | Performed by: NURSE PRACTITIONER

## 2021-10-15 PROCEDURE — 83690 ASSAY OF LIPASE: CPT | Performed by: NURSE PRACTITIONER

## 2021-10-15 RX ORDER — KETOROLAC TROMETHAMINE 30 MG/ML
15 INJECTION, SOLUTION INTRAMUSCULAR; INTRAVENOUS ONCE
Status: COMPLETED | OUTPATIENT
Start: 2021-10-15 | End: 2021-10-15

## 2021-10-15 RX ORDER — ONDANSETRON 4 MG/1
4 TABLET, ORALLY DISINTEGRATING ORAL EVERY 4 HOURS PRN
Qty: 10 TABLET | Refills: 0 | Status: SHIPPED | OUTPATIENT
Start: 2021-10-15 | End: 2021-10-22

## 2021-10-15 RX ORDER — METOCLOPRAMIDE HYDROCHLORIDE 5 MG/ML
10 INJECTION INTRAMUSCULAR; INTRAVENOUS ONCE
Status: COMPLETED | OUTPATIENT
Start: 2021-10-15 | End: 2021-10-15

## 2021-10-15 NOTE — TELEPHONE ENCOUNTER
PACHECO Hurst pt sent to ER  ER f/u 10/16    I received a call from pt  that pt has been ill since 4 am this morning. She has been throwing up since this morning.  I then spoke to pt and she stated that she has a lot of nausea that will not go away

## 2021-10-15 NOTE — ED INITIAL ASSESSMENT (HPI)
Pt to ED with c/o nausea, intermittent chills, intermittent vomiting, and headache onset at 430 am. Pt denies abdominal pain, fever, cough, or CP. Pt A&Ox4. Pt states she recently started a new medication, cannot recall the name. Pt is COVID vaccinated.

## 2021-10-15 NOTE — ED PROVIDER NOTES
Patient Seen in: [facility] AND [facility] Emergency Department      History   Patient presents with:  Nausea/Vomiting/Diarrhea  Headache    Stated Complaint: vomiting, headache     Subjective:   55-year-old female presenting with nausea and vomiting onset 13 ho Psychiatric/Behavioral: Negative. Positive for stated complaint: vomiting, headache   Other systems are as noted in HPI. Constitutional and vital signs reviewed. All other systems reviewed and negative except as noted above.     Physical Exam Behavior: Behavior normal.               ED Course     Labs Reviewed   CBC W/ DIFFERENTIAL - Abnormal; Notable for the following components:       Result Value    HGB 11.2 (*)     HCT 33.6 (*)     All other components within normal limits   BASIC METABOLIC

## 2021-10-16 NOTE — TELEPHONE ENCOUNTER
ED  Discharged   10/15/2021 (2 hours)  [facility] Emergency Department     Treatment team    Providers  Nausea and vomiting in adult    Clinical impression  Nausea/Vomiting/Diarrhea • Headache    Chief complaint

## 2021-12-09 ENCOUNTER — OFFICE VISIT (OUTPATIENT)
Dept: DERMATOLOGY CLINIC | Facility: CLINIC | Age: 56
End: 2021-12-09
Payer: COMMERCIAL

## 2021-12-09 DIAGNOSIS — Z85.828 PERSONAL HISTORY OF SKIN CANCER: ICD-10-CM

## 2021-12-09 DIAGNOSIS — Z86.018 HISTORY OF DYSPLASTIC NEVUS: ICD-10-CM

## 2021-12-09 DIAGNOSIS — Z12.83 SKIN CANCER SCREENING: Primary | ICD-10-CM

## 2021-12-09 DIAGNOSIS — L82.1 SEBORRHEIC KERATOSES: ICD-10-CM

## 2021-12-09 DIAGNOSIS — Z87.2 HISTORY OF ACTINIC KERATOSES: ICD-10-CM

## 2021-12-09 DIAGNOSIS — D22.9 MULTIPLE MELANOCYTIC NEVI: ICD-10-CM

## 2021-12-09 DIAGNOSIS — L57.8 SUN-DAMAGED SKIN: ICD-10-CM

## 2021-12-09 PROCEDURE — 99213 OFFICE O/P EST LOW 20 MIN: CPT | Performed by: DERMATOLOGY

## 2021-12-09 NOTE — PROGRESS NOTES
HPI:     Chief Complaint     Full Skin Exam        HPI     Full Skin Exam      Additional comments: LOV 9/21/21 - Pt presenting for full body skin exam.  Pt has personal hx of Dysplastic Nevus and SCC.           Last edited by Dilma Morse RN on 12/9/ 2009    OU   • Myopia of both eyes with astigmatism 2009    OU   • Ocular migraine 2009    OS   • Pain in joint, ankle and foot, right 1/28/2021   • S/P colonoscopic polypectomy 1/30/2018   • SCCA (squamous cell carcinoma) of skin 2015 2015   • Seasonal Not Asked        G[place] up on a farm: Not Asked        History of tanning: Not Asked        Outdoor occupation: Not Asked        Pt has a pacemaker: No        Pt has a defibrillator: No        Breast feeding: Not Asked        Reaction to local anesthetic: No scattered blotchy brown macules on face, trunk and extremities  - there are some cherry red papules  - there are numerous brown stuck on keratoses.         ASSESSMENT/PLAN:   Skin cancer screening  (primary encounter diagnosis)-no suspicious lesions at this

## 2022-03-10 ENCOUNTER — HOSPITAL ENCOUNTER (OUTPATIENT)
Dept: MAMMOGRAPHY | Facility: HOSPITAL | Age: 57
Discharge: HOME | End: 2022-03-10
Attending: INTERNAL MEDICINE
Payer: COMMERCIAL

## 2022-03-10 ENCOUNTER — HOSPITAL ENCOUNTER (OUTPATIENT)
Dept: BONE DENSITY | Facility: HOSPITAL | Age: 57
Discharge: HOME | End: 2022-03-10
Attending: INTERNAL MEDICINE
Payer: COMMERCIAL

## 2022-03-10 DIAGNOSIS — D05.11 BREAST NEOPLASM, TIS (DCIS), RIGHT: ICD-10-CM

## 2022-03-10 DIAGNOSIS — Z78.0 POST-MENOPAUSAL: ICD-10-CM

## 2022-03-10 PROCEDURE — 77080 DXA BONE DENSITY AXIAL: CPT | Performed by: INTERNAL MEDICINE

## 2022-03-10 PROCEDURE — 77065 DX MAMMO INCL CAD UNI: CPT | Performed by: INTERNAL MEDICINE

## 2022-03-10 PROCEDURE — 77061 BREAST TOMOSYNTHESIS UNI: CPT | Performed by: INTERNAL MEDICINE

## 2022-03-15 RX ORDER — VENLAFAXINE HYDROCHLORIDE 37.5 MG/1
37.5 CAPSULE, EXTENDED RELEASE ORAL DAILY
Qty: 90 CAPSULE | Refills: 3 | Status: SHIPPED | OUTPATIENT
Start: 2022-03-15 | End: 2023-02-27

## 2022-04-05 ENCOUNTER — OFFICE VISIT (OUTPATIENT)
Dept: HEMATOLOGY/ONCOLOGY | Facility: HOSPITAL | Age: 57
End: 2022-04-05
Attending: INTERNAL MEDICINE
Payer: COMMERCIAL

## 2022-04-05 VITALS
DIASTOLIC BLOOD PRESSURE: 80 MMHG | OXYGEN SATURATION: 97 % | TEMPERATURE: 99 F | SYSTOLIC BLOOD PRESSURE: 134 MMHG | BODY MASS INDEX: 28.83 KG/M2 | HEIGHT: 66 IN | HEART RATE: 77 BPM | RESPIRATION RATE: 16 BRPM | WEIGHT: 179.38 LBS

## 2022-04-05 DIAGNOSIS — T45.1X5A HOT FLASHES RELATED TO AROMATASE INHIBITOR THERAPY: ICD-10-CM

## 2022-04-05 DIAGNOSIS — Z79.811 ENCOUNTER FOR MONITORING AROMATASE INHIBITOR THERAPY: ICD-10-CM

## 2022-04-05 DIAGNOSIS — R23.2 HOT FLASHES RELATED TO AROMATASE INHIBITOR THERAPY: ICD-10-CM

## 2022-04-05 DIAGNOSIS — D05.11 BREAST NEOPLASM, TIS (DCIS), RIGHT: Primary | ICD-10-CM

## 2022-04-05 DIAGNOSIS — Z51.81 ENCOUNTER FOR MONITORING AROMATASE INHIBITOR THERAPY: ICD-10-CM

## 2022-04-05 DIAGNOSIS — Z78.0 POST-MENOPAUSAL: ICD-10-CM

## 2022-04-05 PROCEDURE — 99211 OFF/OP EST MAY X REQ PHY/QHP: CPT

## 2022-04-05 RX ORDER — ANASTROZOLE 1 MG/1
1 TABLET ORAL DAILY
Qty: 90 TABLET | Refills: 3 | Status: SHIPPED | OUTPATIENT
Start: 2022-04-05 | End: 2023-03-31

## 2022-09-12 ENCOUNTER — TELEPHONE (OUTPATIENT)
Dept: HEMATOLOGY/ONCOLOGY | Facility: HOSPITAL | Age: 57
End: 2022-09-12

## 2022-09-12 DIAGNOSIS — Z12.31 ENCOUNTER FOR SCREENING MAMMOGRAM FOR MALIGNANT NEOPLASM OF BREAST: Primary | ICD-10-CM

## 2022-09-15 ENCOUNTER — HOSPITAL ENCOUNTER (OUTPATIENT)
Dept: MAMMOGRAPHY | Facility: HOSPITAL | Age: 57
Discharge: HOME | End: 2022-09-15
Attending: INTERNAL MEDICINE
Payer: COMMERCIAL

## 2022-09-15 DIAGNOSIS — D05.11 BREAST NEOPLASM, TIS (DCIS), RIGHT: ICD-10-CM

## 2022-09-15 PROCEDURE — 77063 BREAST TOMOSYNTHESIS BI: CPT | Performed by: INTERNAL MEDICINE

## 2022-09-15 PROCEDURE — 77067 SCR MAMMO BI INCL CAD: CPT | Performed by: INTERNAL MEDICINE

## 2022-10-04 ENCOUNTER — OFFICE VISIT (OUTPATIENT)
Dept: HEMATOLOGY/ONCOLOGY | Facility: HOSPITAL | Age: 57
End: 2022-10-04
Attending: GENETIC COUNSELOR, MS
Payer: COMMERCIAL

## 2022-10-04 VITALS
OXYGEN SATURATION: 96 % | BODY MASS INDEX: 29.06 KG/M2 | SYSTOLIC BLOOD PRESSURE: 114 MMHG | WEIGHT: 180.81 LBS | HEIGHT: 66 IN | DIASTOLIC BLOOD PRESSURE: 78 MMHG | HEART RATE: 72 BPM | TEMPERATURE: 98 F | RESPIRATION RATE: 16 BRPM

## 2022-10-04 DIAGNOSIS — T45.1X5A HOT FLASHES RELATED TO AROMATASE INHIBITOR THERAPY: ICD-10-CM

## 2022-10-04 DIAGNOSIS — Z79.811 ENCOUNTER FOR MONITORING AROMATASE INHIBITOR THERAPY: ICD-10-CM

## 2022-10-04 DIAGNOSIS — Z51.81 ENCOUNTER FOR MONITORING AROMATASE INHIBITOR THERAPY: ICD-10-CM

## 2022-10-04 DIAGNOSIS — D05.11 BREAST NEOPLASM, TIS (DCIS), RIGHT: Primary | ICD-10-CM

## 2022-10-04 DIAGNOSIS — R23.2 HOT FLASHES RELATED TO AROMATASE INHIBITOR THERAPY: ICD-10-CM

## 2022-10-04 PROCEDURE — 99214 OFFICE O/P EST MOD 30 MIN: CPT | Performed by: INTERNAL MEDICINE

## 2022-12-05 ENCOUNTER — TELEPHONE (OUTPATIENT)
Facility: CLINIC | Age: 57
End: 2022-12-05

## 2022-12-05 NOTE — TELEPHONE ENCOUNTER
----- Message from Florance Cogan, RN sent at 2018  8:47 AM CST -----  Regardin yr CLN recall  Entered into EPIC:Recall colon in 5 years per Dr. Maria D Greer. Last Colon done 18, next due 23. Snapshot updated. Letter mailed to pt home address today.

## 2022-12-12 ENCOUNTER — OFFICE VISIT (OUTPATIENT)
Dept: DERMATOLOGY CLINIC | Facility: CLINIC | Age: 57
End: 2022-12-12
Payer: COMMERCIAL

## 2022-12-12 DIAGNOSIS — D23.9 BENIGN NEOPLASM OF SKIN, UNSPECIFIED LOCATION: ICD-10-CM

## 2022-12-12 DIAGNOSIS — D23.9 DERMATOFIBROMA: ICD-10-CM

## 2022-12-12 DIAGNOSIS — Z86.018 HISTORY OF DYSPLASTIC NEVUS: ICD-10-CM

## 2022-12-12 DIAGNOSIS — L57.0 ACTINIC KERATOSIS: Primary | ICD-10-CM

## 2022-12-12 DIAGNOSIS — D22.9 MULTIPLE MELANOCYTIC NEVI: ICD-10-CM

## 2022-12-12 DIAGNOSIS — L82.1 SEBORRHEIC KERATOSES: ICD-10-CM

## 2022-12-12 DIAGNOSIS — Z85.828 PERSONAL HISTORY OF SKIN CANCER: ICD-10-CM

## 2022-12-12 PROCEDURE — 99214 OFFICE O/P EST MOD 30 MIN: CPT | Performed by: DERMATOLOGY

## 2022-12-12 RX ORDER — CHLORHEXIDINE GLUCONATE 0.12 MG/ML
RINSE ORAL
COMMUNITY
Start: 2022-07-19

## 2022-12-12 RX ORDER — FEXOFENADINE HCL 180 MG/1
TABLET ORAL
COMMUNITY
Start: 2022-01-01

## 2022-12-12 RX ORDER — IBUPROFEN 600 MG/1
600 TABLET ORAL EVERY 6 HOURS PRN
COMMUNITY
Start: 2022-11-02

## 2023-02-03 ENCOUNTER — LAB ENCOUNTER (OUTPATIENT)
Dept: LAB | Age: 58
End: 2023-02-03
Attending: INTERNAL MEDICINE
Payer: COMMERCIAL

## 2023-02-03 ENCOUNTER — OFFICE VISIT (OUTPATIENT)
Dept: INTERNAL MEDICINE CLINIC | Facility: CLINIC | Age: 58
End: 2023-02-03

## 2023-02-03 VITALS
WEIGHT: 183 LBS | BODY MASS INDEX: 29.41 KG/M2 | HEART RATE: 66 BPM | HEIGHT: 66 IN | SYSTOLIC BLOOD PRESSURE: 130 MMHG | DIASTOLIC BLOOD PRESSURE: 80 MMHG

## 2023-02-03 DIAGNOSIS — Z00.00 PHYSICAL EXAM: Primary | ICD-10-CM

## 2023-02-03 DIAGNOSIS — Z12.4 SCREENING FOR CERVICAL CANCER: ICD-10-CM

## 2023-02-03 DIAGNOSIS — Z12.11 COLON CANCER SCREENING: ICD-10-CM

## 2023-02-03 PROBLEM — Z80.0 FAMILY HISTORY OF COLON CANCER: Status: RESOLVED | Noted: 2018-01-16 | Resolved: 2023-02-03

## 2023-02-03 PROBLEM — D05.11 BREAST NEOPLASM, TIS (DCIS), RIGHT: Status: RESOLVED | Noted: 2020-02-07 | Resolved: 2023-02-03

## 2023-02-03 PROBLEM — M19.071 OSTEOARTHRITIS OF FIRST METATARSOPHALANGEAL (MTP) JOINT OF RIGHT FOOT: Status: RESOLVED | Noted: 2021-02-15 | Resolved: 2023-02-03

## 2023-02-03 PROBLEM — L57.8 SUN-DAMAGED SKIN: Status: RESOLVED | Noted: 2017-12-07 | Resolved: 2023-02-03

## 2023-02-03 PROBLEM — L57.0 ACTINIC KERATOSIS: Status: RESOLVED | Noted: 2017-04-04 | Resolved: 2023-02-03

## 2023-02-03 PROBLEM — Z12.83 SKIN CANCER SCREENING: Status: RESOLVED | Noted: 2021-06-08 | Resolved: 2023-02-03

## 2023-02-03 LAB
ALBUMIN SERPL-MCNC: 4.1 G/DL (ref 3.4–5)
ALBUMIN/GLOB SERPL: 1.2 {RATIO} (ref 1–2)
ALP LIVER SERPL-CCNC: 68 U/L
ALT SERPL-CCNC: 25 U/L
ANION GAP SERPL CALC-SCNC: 7 MMOL/L (ref 0–18)
AST SERPL-CCNC: 18 U/L (ref 15–37)
BASOPHILS # BLD AUTO: 0.03 X10(3) UL (ref 0–0.2)
BASOPHILS NFR BLD AUTO: 0.6 %
BILIRUB SERPL-MCNC: 0.4 MG/DL (ref 0.1–2)
BILIRUB UR QL: NEGATIVE
BUN BLD-MCNC: 14 MG/DL (ref 7–18)
BUN/CREAT SERPL: 15.7 (ref 10–20)
CALCIUM BLD-MCNC: 9.6 MG/DL (ref 8.5–10.1)
CHLORIDE SERPL-SCNC: 107 MMOL/L (ref 98–112)
CHOLEST SERPL-MCNC: 235 MG/DL (ref ?–200)
CLARITY UR: CLEAR
CO2 SERPL-SCNC: 25 MMOL/L (ref 21–32)
COLOR UR: YELLOW
CREAT BLD-MCNC: 0.89 MG/DL
DEPRECATED RDW RBC AUTO: 42.6 FL (ref 35.1–46.3)
EOSINOPHIL # BLD AUTO: 0.11 X10(3) UL (ref 0–0.7)
EOSINOPHIL NFR BLD AUTO: 2.2 %
ERYTHROCYTE [DISTWIDTH] IN BLOOD BY AUTOMATED COUNT: 12.8 % (ref 11–15)
EST. AVERAGE GLUCOSE BLD GHB EST-MCNC: 111 MG/DL (ref 68–126)
FASTING PATIENT LIPID ANSWER: YES
FASTING STATUS PATIENT QL REPORTED: YES
GFR SERPLBLD BASED ON 1.73 SQ M-ARVRAT: 76 ML/MIN/1.73M2 (ref 60–?)
GLOBULIN PLAS-MCNC: 3.3 G/DL (ref 2.8–4.4)
GLUCOSE BLD-MCNC: 87 MG/DL (ref 70–99)
GLUCOSE UR-MCNC: NEGATIVE MG/DL
HBA1C MFR BLD: 5.5 % (ref ?–5.7)
HCT VFR BLD AUTO: 39.1 %
HDLC SERPL-MCNC: 74 MG/DL (ref 40–59)
HGB BLD-MCNC: 12.7 G/DL
IMM GRANULOCYTES # BLD AUTO: 0.01 X10(3) UL (ref 0–1)
IMM GRANULOCYTES NFR BLD: 0.2 %
KETONES UR-MCNC: NEGATIVE MG/DL
LDLC SERPL CALC-MCNC: 144 MG/DL (ref ?–100)
LEUKOCYTE ESTERASE UR QL STRIP.AUTO: NEGATIVE
LYMPHOCYTES # BLD AUTO: 2.03 X10(3) UL (ref 1–4)
LYMPHOCYTES NFR BLD AUTO: 40.3 %
MCH RBC QN AUTO: 29.3 PG (ref 26–34)
MCHC RBC AUTO-ENTMCNC: 32.5 G/DL (ref 31–37)
MCV RBC AUTO: 90.3 FL
MONOCYTES # BLD AUTO: 0.36 X10(3) UL (ref 0.1–1)
MONOCYTES NFR BLD AUTO: 7.1 %
NEUTROPHILS # BLD AUTO: 2.5 X10 (3) UL (ref 1.5–7.7)
NEUTROPHILS # BLD AUTO: 2.5 X10(3) UL (ref 1.5–7.7)
NEUTROPHILS NFR BLD AUTO: 49.6 %
NITRITE UR QL STRIP.AUTO: NEGATIVE
NONHDLC SERPL-MCNC: 161 MG/DL (ref ?–130)
OSMOLALITY SERPL CALC.SUM OF ELEC: 288 MOSM/KG (ref 275–295)
PH UR: 6.5 [PH] (ref 5–8)
PLATELET # BLD AUTO: 267 10(3)UL (ref 150–450)
POTASSIUM SERPL-SCNC: 4.5 MMOL/L (ref 3.5–5.1)
PROT SERPL-MCNC: 7.4 G/DL (ref 6.4–8.2)
PROT UR-MCNC: NEGATIVE MG/DL
RBC # BLD AUTO: 4.33 X10(6)UL
SODIUM SERPL-SCNC: 139 MMOL/L (ref 136–145)
SP GR UR STRIP: 1.01 (ref 1–1.03)
T4 FREE SERPL-MCNC: 0.9 NG/DL (ref 0.8–1.7)
TRIGL SERPL-MCNC: 97 MG/DL (ref 30–149)
TSI SER-ACNC: 1.25 MIU/ML (ref 0.36–3.74)
UROBILINOGEN UR STRIP-ACNC: 0.2
VLDLC SERPL CALC-MCNC: 18 MG/DL (ref 0–30)
WBC # BLD AUTO: 5 X10(3) UL (ref 4–11)

## 2023-02-03 PROCEDURE — 84439 ASSAY OF FREE THYROXINE: CPT | Performed by: INTERNAL MEDICINE

## 2023-02-03 PROCEDURE — 3079F DIAST BP 80-89 MM HG: CPT | Performed by: INTERNAL MEDICINE

## 2023-02-03 PROCEDURE — 85025 COMPLETE CBC W/AUTO DIFF WBC: CPT | Performed by: INTERNAL MEDICINE

## 2023-02-03 PROCEDURE — 3008F BODY MASS INDEX DOCD: CPT | Performed by: INTERNAL MEDICINE

## 2023-02-03 PROCEDURE — 80053 COMPREHEN METABOLIC PANEL: CPT | Performed by: INTERNAL MEDICINE

## 2023-02-03 PROCEDURE — 36415 COLL VENOUS BLD VENIPUNCTURE: CPT | Performed by: INTERNAL MEDICINE

## 2023-02-03 PROCEDURE — 81001 URINALYSIS AUTO W/SCOPE: CPT | Performed by: INTERNAL MEDICINE

## 2023-02-03 PROCEDURE — 81015 MICROSCOPIC EXAM OF URINE: CPT | Performed by: INTERNAL MEDICINE

## 2023-02-03 PROCEDURE — 83036 HEMOGLOBIN GLYCOSYLATED A1C: CPT | Performed by: INTERNAL MEDICINE

## 2023-02-03 PROCEDURE — 3075F SYST BP GE 130 - 139MM HG: CPT | Performed by: INTERNAL MEDICINE

## 2023-02-03 PROCEDURE — 84443 ASSAY THYROID STIM HORMONE: CPT | Performed by: INTERNAL MEDICINE

## 2023-02-03 PROCEDURE — 99396 PREV VISIT EST AGE 40-64: CPT | Performed by: INTERNAL MEDICINE

## 2023-02-03 PROCEDURE — 80061 LIPID PANEL: CPT | Performed by: INTERNAL MEDICINE

## 2023-02-03 RX ORDER — RIZATRIPTAN BENZOATE 10 MG/1
TABLET ORAL
Qty: 9 TABLET | Refills: 1 | Status: SHIPPED | OUTPATIENT
Start: 2023-02-03

## 2023-02-17 ENCOUNTER — NURSE ONLY (OUTPATIENT)
Facility: CLINIC | Age: 58
End: 2023-02-17

## 2023-02-17 DIAGNOSIS — Z86.010 PERSONAL HISTORY OF COLONIC POLYPS: Primary | ICD-10-CM

## 2023-02-17 RX ORDER — SODIUM, POTASSIUM,MAG SULFATES 17.5-3.13G
SOLUTION, RECONSTITUTED, ORAL ORAL
Qty: 1 EACH | Refills: 0 | Status: SHIPPED | OUTPATIENT
Start: 2023-02-17

## 2023-02-17 NOTE — PROGRESS NOTES
May schedule a colonoscopy for a history of colon polyps following a split dose Suprep and monitored anesthesia care.

## 2023-03-06 ENCOUNTER — TELEPHONE (OUTPATIENT)
Dept: HEMATOLOGY/ONCOLOGY | Facility: HOSPITAL | Age: 58
End: 2023-03-06

## 2023-03-06 RX ORDER — ANASTROZOLE 1 MG/1
1 TABLET ORAL DAILY
Qty: 90 TABLET | Refills: 1 | Status: SHIPPED | OUTPATIENT
Start: 2023-03-06 | End: 2024-02-29

## 2023-03-06 NOTE — TELEPHONE ENCOUNTER
[facility]: Serina Bello calling patient has a prescription for 80 day supply.  Insurance requires a 90 day, would like a new Prescriptions sent in for this medication:  Anastrozole 1 MG Oral Tab tab  [id number] - [place], [place] - [address], [id number], [phone] Thanks Demetrio wolff

## 2023-04-04 ENCOUNTER — OFFICE VISIT (OUTPATIENT)
Dept: HEMATOLOGY/ONCOLOGY | Facility: HOSPITAL | Age: 58
End: 2023-04-04
Attending: INTERNAL MEDICINE
Payer: COMMERCIAL

## 2023-04-04 VITALS
TEMPERATURE: 98 F | SYSTOLIC BLOOD PRESSURE: 138 MMHG | DIASTOLIC BLOOD PRESSURE: 86 MMHG | OXYGEN SATURATION: 100 % | RESPIRATION RATE: 16 BRPM | HEIGHT: 66 IN | HEART RATE: 57 BPM | BODY MASS INDEX: 29.12 KG/M2 | WEIGHT: 181.19 LBS

## 2023-04-04 DIAGNOSIS — Z79.811 ENCOUNTER FOR MONITORING AROMATASE INHIBITOR THERAPY: ICD-10-CM

## 2023-04-04 DIAGNOSIS — Z51.81 ENCOUNTER FOR MONITORING AROMATASE INHIBITOR THERAPY: ICD-10-CM

## 2023-04-04 DIAGNOSIS — R23.2 HOT FLASHES RELATED TO AROMATASE INHIBITOR THERAPY: ICD-10-CM

## 2023-04-04 DIAGNOSIS — D05.11 BREAST NEOPLASM, TIS (DCIS), RIGHT: Primary | ICD-10-CM

## 2023-04-04 DIAGNOSIS — T45.1X5A HOT FLASHES RELATED TO AROMATASE INHIBITOR THERAPY: ICD-10-CM

## 2023-04-04 DIAGNOSIS — Z12.31 SCREENING MAMMOGRAM FOR BREAST CANCER: ICD-10-CM

## 2023-04-04 PROCEDURE — 99214 OFFICE O/P EST MOD 30 MIN: CPT | Performed by: INTERNAL MEDICINE

## 2023-04-28 ENCOUNTER — SURGERY CENTER DOCUMENTATION (OUTPATIENT)
Dept: SURGERY | Age: 58
End: 2023-04-28

## 2023-04-28 NOTE — PROCEDURES
[address] Endoscopy Report      Date of Procedure:  04/28/23      Preoperative Diagnosis:  1. Colorectal cancer screening  2. Personal history of adenomatous colon polyp      Postoperative Diagnosis:  Diminutive colon polyps      Procedure:    Colonoscopy with polypectomy      Surgeon:  Rosita Tompkins M.D. Anesthesia:  Monitored anesthesia care  Cecal withdrawal time:  17 minutes  EBL:  Insignificant      Brief History: This is a 62year old female who presents for a screening/surveillance colonoscopy in the setting of a history of a solitary subcentimeter tubular adenoma removed from the colon a little over 5 years prior. The patient has been asymptomatic from a lower gastrointestinal tract standpoint. Technique:  After informed consent, the patient was placed in the left lateral recumbent position. Digital rectal examination revealed no palpable intraluminal abnormalities. An Olympus variable stiffness 190 series HD colonoscope was inserted into the rectum and advanced under direct vision by following the lumen to the terminal ileum. The colon was examined upon withdrawal in the left lateral recumbent position. Findings:  The preparation of the colon was very good. The terminal ileum was examined for a few cm and visually normal.  The ileocecal valve was well preserved. The visualized colonic mucosa from the cecum to the anal verge was normal with an intact vascular pattern. There were #2 3-4 mm sessile polyps in the distal transverse colon which were cold snare excised and retrieved. No ongoing bleeding. There were no other colonic polyps, definite diverticula, mass lesions, vascular anomalies or signs of inflammation seen. Retroflexion in the rectum revealed no abnormalities. The procedure was well tolerated without immediate complication. Impression:  1. Diminutive colon polyps  2.   Otherwise normal colonoscopy to the terminal ileum    Recommendations: Follow-up biopsy results. Polyp histology to determine recommendations regarding follow-up.         Claudene Lemons, MD  4/28/2023

## 2023-05-02 ENCOUNTER — TELEPHONE (OUTPATIENT)
Facility: CLINIC | Age: 58
End: 2023-05-02

## 2023-05-02 NOTE — TELEPHONE ENCOUNTER
Dr Bryn Gómez    Received fax from [id number] path report.     Collected on 4/28/2023    Letter sent to scanning

## 2023-05-03 NOTE — TELEPHONE ENCOUNTER
I spoke to the patient. She is feeling well. The polyps were hyperplastic. She has had a solitary subcentimeter tubular adenoma removed previously. As per current guidelines, I have recommended that we resume routine screening with a screening colonoscopy in 10 years unless any new symptoms or signs are noted. GI RNs: Please enter colonoscopy recall for 10 years.

## 2023-05-04 NOTE — TELEPHONE ENCOUNTER
Health maintenance updated. 10 year colonoscopy recall placed in patient outreach. Next due on 04/28/2033 per Dr. Segundo Nath.

## 2023-09-27 ENCOUNTER — HOSPITAL ENCOUNTER (OUTPATIENT)
Dept: MAMMOGRAPHY | Facility: HOSPITAL | Age: 58
Discharge: HOME | End: 2023-09-27
Attending: INTERNAL MEDICINE
Payer: COMMERCIAL

## 2023-09-27 DIAGNOSIS — Z12.31 SCREENING MAMMOGRAM FOR BREAST CANCER: ICD-10-CM

## 2023-09-27 PROCEDURE — 77067 SCR MAMMO BI INCL CAD: CPT | Performed by: INTERNAL MEDICINE

## 2023-09-27 PROCEDURE — 77063 BREAST TOMOSYNTHESIS BI: CPT | Performed by: INTERNAL MEDICINE

## 2023-10-05 ENCOUNTER — OFFICE VISIT (OUTPATIENT)
Dept: HEMATOLOGY/ONCOLOGY | Facility: HOSPITAL | Age: 58
End: 2023-10-05
Attending: INTERNAL MEDICINE
Payer: COMMERCIAL

## 2023-10-05 VITALS
BODY MASS INDEX: 29.41 KG/M2 | DIASTOLIC BLOOD PRESSURE: 81 MMHG | RESPIRATION RATE: 16 BRPM | HEIGHT: 66 IN | TEMPERATURE: 99 F | WEIGHT: 183 LBS | HEART RATE: 64 BPM | OXYGEN SATURATION: 97 % | SYSTOLIC BLOOD PRESSURE: 131 MMHG

## 2023-10-05 DIAGNOSIS — Z12.31 SCREENING MAMMOGRAM FOR BREAST CANCER: ICD-10-CM

## 2023-10-05 DIAGNOSIS — Z51.81 ENCOUNTER FOR MONITORING AROMATASE INHIBITOR THERAPY: ICD-10-CM

## 2023-10-05 DIAGNOSIS — D05.11 BREAST NEOPLASM, TIS (DCIS), RIGHT: Primary | ICD-10-CM

## 2023-10-05 DIAGNOSIS — Z78.0 POST-MENOPAUSAL: ICD-10-CM

## 2023-10-05 DIAGNOSIS — T45.1X5A HOT FLASHES RELATED TO AROMATASE INHIBITOR THERAPY: ICD-10-CM

## 2023-10-05 DIAGNOSIS — Z79.811 ENCOUNTER FOR MONITORING AROMATASE INHIBITOR THERAPY: ICD-10-CM

## 2023-10-05 DIAGNOSIS — R23.2 HOT FLASHES RELATED TO AROMATASE INHIBITOR THERAPY: ICD-10-CM

## 2023-10-05 PROCEDURE — 99214 OFFICE O/P EST MOD 30 MIN: CPT | Performed by: INTERNAL MEDICINE

## 2023-10-17 ENCOUNTER — NURSE TRIAGE (OUTPATIENT)
Dept: INTERNAL MEDICINE CLINIC | Facility: CLINIC | Age: 58
End: 2023-10-17

## 2023-10-17 NOTE — TELEPHONE ENCOUNTER
Please reply to pool: EM RN TRIAGE  Action Requested: Summary for Provider     []  Critical Lab, Recommendations Needed  [x] Need Additional Advice  []   FYI    []   Need Orders  [] Need Medications Sent to Pharmacy  []  Other     SUMMARY: Patient contacts clinic reporting today at the grocery store she experienced a popping sensation behind her right eye followed by a sharp pain that lasted seconds and then resolved. Denies vision loss, eye pain, blurred vision, dizziness, lightheadedness, chest pain, shortness of breath, facial drooping, weakness, numbness or tingling. Symptoms have completely resolved. She inquires if further assessment is required or if she should monitor for recurrence. Dr. Charlotte Geronimo please advise,  ER flags discussed.       Reason for call: Acute  Onset: Data Unavailable                       Reason for Disposition   New headache and age > 50    Protocols used: Headache-A-[place]

## 2023-10-18 NOTE — TELEPHONE ENCOUNTER
Pt following up on call. States still very mild headache. Still has shooting pain in the back of the eye, but only for a few seconds. Pt concerned she felt something pop yesterday. Pt expressed concerns of aneurysm. Denies blurred vision, weakness. Advised evaluation in case imaging is needed. Pt agrees. No more in person appointment available today. Scheduled with DARIAN Finney tomorrow.   Future Appointments   Date Time Provider Jung Duffy   10/19/2023 11:00 AM DARIAN Celeste [facility]   10/26/2023 10:00 AM Kia Gusman MD [facility]   12/6/2023  2:15 PM Luther Shah MD [address] [place] [address]   4/10/2024 11:00 AM Martin Gaspar MD [facility] HEM ONC EMO

## 2023-10-19 ENCOUNTER — OFFICE VISIT (OUTPATIENT)
Dept: INTERNAL MEDICINE CLINIC | Facility: CLINIC | Age: 58
End: 2023-10-19

## 2023-10-19 VITALS
WEIGHT: 185.19 LBS | HEART RATE: 89 BPM | SYSTOLIC BLOOD PRESSURE: 113 MMHG | BODY MASS INDEX: 29.76 KG/M2 | DIASTOLIC BLOOD PRESSURE: 78 MMHG | HEIGHT: 66 IN | OXYGEN SATURATION: 95 %

## 2023-10-19 DIAGNOSIS — R51.9 RIGHT-SIDED HEADACHE: Primary | ICD-10-CM

## 2023-10-19 DIAGNOSIS — Z23 NEED FOR TETANUS, DIPHTHERIA, AND ACELLULAR PERTUSSIS (TDAP) VACCINE: ICD-10-CM

## 2023-10-19 PROCEDURE — 3078F DIAST BP <80 MM HG: CPT | Performed by: NURSE PRACTITIONER

## 2023-10-19 PROCEDURE — 3074F SYST BP LT 130 MM HG: CPT | Performed by: NURSE PRACTITIONER

## 2023-10-19 PROCEDURE — 90715 TDAP VACCINE 7 YRS/> IM: CPT | Performed by: NURSE PRACTITIONER

## 2023-10-19 PROCEDURE — 90471 IMMUNIZATION ADMIN: CPT | Performed by: NURSE PRACTITIONER

## 2023-10-19 PROCEDURE — 3008F BODY MASS INDEX DOCD: CPT | Performed by: NURSE PRACTITIONER

## 2023-10-19 PROCEDURE — 99213 OFFICE O/P EST LOW 20 MIN: CPT | Performed by: NURSE PRACTITIONER

## 2023-10-20 ENCOUNTER — HOSPITAL ENCOUNTER (OUTPATIENT)
Dept: CT IMAGING | Facility: HOSPITAL | Age: 58
Discharge: HOME | End: 2023-10-20
Attending: NURSE PRACTITIONER
Payer: COMMERCIAL

## 2023-10-20 DIAGNOSIS — R51.9 RIGHT-SIDED HEADACHE: ICD-10-CM

## 2023-10-20 PROCEDURE — 70450 CT HEAD/BRAIN W/O DYE: CPT | Performed by: NURSE PRACTITIONER

## 2023-10-23 ENCOUNTER — HOSPITAL ENCOUNTER (OUTPATIENT)
Age: 58
Discharge: HOME | End: 2023-10-23
Payer: COMMERCIAL

## 2023-10-23 VITALS
HEART RATE: 79 BPM | TEMPERATURE: 98 F | SYSTOLIC BLOOD PRESSURE: 127 MMHG | DIASTOLIC BLOOD PRESSURE: 90 MMHG | RESPIRATION RATE: 16 BRPM | OXYGEN SATURATION: 97 %

## 2023-10-23 DIAGNOSIS — B02.30 HERPES ZOSTER OPHTHALMICUS OF RIGHT EYE: Primary | ICD-10-CM

## 2023-10-23 PROCEDURE — 99213 OFFICE O/P EST LOW 20 MIN: CPT

## 2023-10-23 RX ORDER — VALACYCLOVIR HYDROCHLORIDE 1 G/1
1000 TABLET, FILM COATED ORAL 3 TIMES DAILY
Qty: 21 TABLET | Refills: 0 | Status: SHIPPED | OUTPATIENT
Start: 2023-10-23 | End: 2023-10-30

## 2023-10-23 NOTE — ED INITIAL ASSESSMENT (HPI)
Patient arrives ambulatory with c/o rash to right side of forehead and scalp that has spread down over her eyelid this morning. Patient reports rash since Wednesday, painful to touch.

## 2023-10-26 ENCOUNTER — OFFICE VISIT (OUTPATIENT)
Dept: OBGYN CLINIC | Facility: CLINIC | Age: 58
End: 2023-10-26

## 2023-10-26 VITALS
BODY MASS INDEX: 30 KG/M2 | SYSTOLIC BLOOD PRESSURE: 129 MMHG | HEART RATE: 66 BPM | DIASTOLIC BLOOD PRESSURE: 86 MMHG | WEIGHT: 183.81 LBS

## 2023-10-26 DIAGNOSIS — Z12.4 SCREENING FOR CERVICAL CANCER: ICD-10-CM

## 2023-10-26 DIAGNOSIS — Z01.419 ENCOUNTER FOR GYNECOLOGICAL EXAMINATION: Primary | ICD-10-CM

## 2023-10-26 DIAGNOSIS — Z85.3 HISTORY OF BREAST CANCER: ICD-10-CM

## 2023-10-26 PROCEDURE — 99386 PREV VISIT NEW AGE 40-64: CPT | Performed by: OBSTETRICS & GYNECOLOGY

## 2023-10-26 PROCEDURE — 3074F SYST BP LT 130 MM HG: CPT | Performed by: OBSTETRICS & GYNECOLOGY

## 2023-10-26 PROCEDURE — 3079F DIAST BP 80-89 MM HG: CPT | Performed by: OBSTETRICS & GYNECOLOGY

## 2023-10-27 LAB — HPV I/H RISK 1 DNA SPEC QL NAA+PROBE: NEGATIVE

## 2023-12-01 RX ORDER — VENLAFAXINE HYDROCHLORIDE 37.5 MG/1
37.5 CAPSULE, EXTENDED RELEASE ORAL DAILY
Qty: 90 CAPSULE | Refills: 3 | Status: SHIPPED | OUTPATIENT
Start: 2023-12-01

## 2023-12-06 ENCOUNTER — OFFICE VISIT (OUTPATIENT)
Dept: DERMATOLOGY CLINIC | Facility: CLINIC | Age: 58
End: 2023-12-06
Payer: COMMERCIAL

## 2023-12-06 DIAGNOSIS — D23.9 DERMATOFIBROMA: ICD-10-CM

## 2023-12-06 DIAGNOSIS — Z86.018 HISTORY OF DYSPLASTIC NEVUS: ICD-10-CM

## 2023-12-06 DIAGNOSIS — L57.0 ACTINIC KERATOSIS: Primary | ICD-10-CM

## 2023-12-06 DIAGNOSIS — D22.9 MULTIPLE MELANOCYTIC NEVI: ICD-10-CM

## 2023-12-06 DIAGNOSIS — Z87.2 HISTORY OF ACTINIC KERATOSES: ICD-10-CM

## 2023-12-06 DIAGNOSIS — D23.9 BENIGN NEOPLASM OF SKIN, UNSPECIFIED LOCATION: ICD-10-CM

## 2023-12-06 DIAGNOSIS — L82.1 SEBORRHEIC KERATOSES: ICD-10-CM

## 2023-12-06 DIAGNOSIS — Z85.828 PERSONAL HISTORY OF SKIN CANCER: ICD-10-CM

## 2023-12-06 PROCEDURE — 99213 OFFICE O/P EST LOW 20 MIN: CPT | Performed by: DERMATOLOGY

## 2023-12-06 RX ORDER — IBUPROFEN 600 MG/1
TABLET ORAL
COMMUNITY
Start: 2023-06-14

## 2023-12-11 NOTE — PROGRESS NOTES
Gela Heath is a 62year old female. HPI:     CC:    Chief Complaint   Patient presents with    Full Skin Exam     LOV 12/12/22; hx of AK, DN, SCC;  pt presenting today for full skin exam, notes spots on right mid upper abdomen, and hard growth on right forearm.   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    Gastritis     History of dysplastic nevus 11/13/2014    Internal hemorrhoids 01/30/2018    Ocular migraine 2009    OS    Osteoarthritis of first metatarsophalangeal (MTP) joint of right foot 02/15/2021    SCCA (squamous cell carcinoma) of skin 2015 2015    Seasonal allergies       Past Surgical History:   Procedure Laterality Date    COLONOSCOPY  2010    COLONOSCOPY N/A 01/30/2018    Procedure: COLONOSCOPY;  Surgeon: Fausto Prince MD;  Location: [facility] SURGERY CATHETER EPS DX/ABLATION OTHER THAN 3D       hand surgery    LUMPECTOMY RIGHT      KEIRA BIOPSY STEREO NODULE 1 SITE LEFT (CPT=19081)  12/03/2019    calcs    KEIRA BIOPSY STEREO NODULE 1 SITE RIGHT (CPT=19081)  12/03/2019    calcification    KEIRA BIOPSY STEREO NODULE 1 SITE RIGHT (CPT=19081)  12/13/2019    calcs    RADIATION RIGHT      02/ & 03/2020    TONSILLECTOMY      UPPER GI ENDOSCOPY,BIOPSY  2010    EGD with biopsy      Family History   Problem Relation Age of Onset    Breast Cancer Self 54    Squamous Cell Self 49        arm    Breast Cancer Mother 67    Other (possible hx of skin cancer) Brother     Colon Cancer Maternal Grandmother 67        Rectal cancer    Prostate Cancer Maternal Grandfather 66 Stroke Maternal Grandfather         CVA    Hypertension Paternal Grandmother     Neurological Disorder Paternal Grandmother         Alzheimer's disease    Heart Disease Paternal Grandfather         CAD    Uterine Cancer Maternal Aunt 79    Cancer Paternal Uncle         unknown type    Colon Cancer Maternal Great-Grandmother 21        in her 19's and  of disease      Social History     Socioeconomic History    Marital status:    Tobacco Use    Smoking status: Never    Smokeless tobacco: Never   Vaping Use    Vaping Use: Never used   Substance and Sexual Activity    Alcohol use: Yes     Comment: Weekly Wine 5 drinks;     Drug use: Never    Sexual activity: Yes   Other Topics Concern    Caffeine Concern Yes     Comment: Coffee, 3 cups daily; Pt has a pacemaker No    Pt has a defibrillator No    Reaction to local anesthetic No        Current Outpatient Medications   Medication Sig Dispense Refill    ibuprofen 600 MG Oral Tab       venlafaxine ER 37.5 MG Oral Capsule SR 24 Hr Take 1 capsule (37.5 mg total) by mouth daily. 90 capsule 3    anastrozole 1 MG Oral Tab tab Take 1 tablet (1 mg total) by mouth daily. 90 tablet 3    fexofenadine 180 MG Oral Tab       KRILL OIL OR Take by mouth daily. Allergies:    Allergies   Allergen Reactions    Stovall HIVES     Adhesive Glue - rash        Past Medical History:   Diagnosis Date    Actinic keratosis 2021    Left arm-Focal actinic keratosis with perifollicular fibrosis suggestive of ruptured cyst    Anemia, iron deficiency     Atypical nevus     skin of left medial ankle    Breast cancer ([facility] [address] 2019    right breast    Breast neoplasm, Tis (DCIS), right 2020    Gastritis     History of dysplastic nevus 2014    Internal hemorrhoids 2018    Ocular migraine 2009    OS    Osteoarthritis of first metatarsophalangeal (MTP) joint of right foot 02/15/2021    SCCA (squamous cell carcinoma) of skin 2015    Seasonal allergies      Past Surgical History:   Procedure Laterality Date    COLONOSCOPY  2010    COLONOSCOPY N/A 01/30/2018    Procedure: COLONOSCOPY;  Surgeon: Vickie Jennings MD;  Location: [facility] SURGERY CATHETER EPS DX/ABLATION OTHER THAN 3D       hand surgery    LUMPECTOMY RIGHT      KEIRA BIOPSY STEREO NODULE 1 SITE LEFT (CPT=19081)  12/03/2019    calcs    KEIRA BIOPSY STEREO NODULE 1 SITE RIGHT (CPT=19081)  12/03/2019    calcification    KEIRA BIOPSY STEREO NODULE 1 SITE RIGHT (CPT=19081)  12/13/2019    calcs    RADIATION RIGHT      02/ & 03/2020    TONSILLECTOMY      UPPER GI ENDOSCOPY,BIOPSY  2010    EGD with biopsy     Social History     Socioeconomic History    Marital status:      Spouse name: Not on file    Number of children: Not on file    Years of education: Not on file    Highest education level: Not on file   Occupational History    Not on file   Tobacco Use    Smoking status: Never    Smokeless tobacco: Never   Vaping Use    Vaping Use: Never used   Substance and Sexual Activity    Alcohol use: Yes     Comment: Weekly Wine 5 drinks;     Drug use: Never    Sexual activity: Yes   Other Topics Concern     Service Not Asked    Blood Transfusions Not Asked    Caffeine Concern Yes     Comment: Coffee, 3 cups daily;      Occupational Exposure Not Asked    [address] Hazards Not Asked    Sleep Concern Not Asked    Stress Concern Not Asked    Weight Concern Not Asked    Special Diet Not Asked    Back Care Not Asked    Exercise Not Asked    Bike Helmet Not Asked    Seat Belt Not Asked    Self-Exams Not Asked    Grew up on a farm Not Asked    History of tanning Not Asked    Outdoor occupation Not Asked    Pt has a pacemaker No    Pt has a defibrillator No    Breast feeding Not Asked    Reaction to local anesthetic No   Social History Narrative    Not on file     Social Determinants of Health     Financial Resource Strain: Not on file   Food Insecurity: Not on file   Transportation Needs: Not on file Physical Activity: Not on file   Stress: Not on file   Social Connections: Not on file   Housing Stability: Not on file     Family History   Problem Relation Age of Onset    Breast Cancer Self 47    Squamous Cell Self 49        arm    Breast Cancer Mother 67    Other (possible hx of skin cancer) Brother     Colon Cancer Maternal Grandmother 79        Rectal cancer    Prostate Cancer Maternal Grandfather 78    Stroke Maternal Grandfather         CVA    Hypertension Paternal Grandmother     Neurological Disorder Paternal Grandmother         Alzheimer's disease    Heart Disease Paternal Grandfather         CAD    Uterine Cancer Maternal Aunt 79    Cancer Paternal Uncle         unknown type    Colon Cancer Maternal Great-Grandmother 21        in her 19's and  of disease       There were no vitals filed for this visit. HPI:  Chief Complaint   Patient presents with    Full Skin Exam     LOV 22; hx of AK, DN, SCC;  pt presenting today for full skin exam, notes spots on right mid upper abdomen, and hard growth on right forearm. Additional spot of concern per patient is a brown growth on her right malar cheek        Follow-up history erythematous scaling keratotic papules, dysplastic nevi, SCC. No particular concerns. Patient hopes to travel to [place] patient's daughter [employer] stationed in [facility] .will be transferred to [facility] -son just  patient presents with concerns above. Patient has been in their usual state of health. Past notes/ records and appropriate/relevant lab results including pathology and past body maps reviewed. Including outside notes/ PCP notes as appropriate. Updated and new information noted in current visit. ROS:  Denies other relevant systemic complaints. History, medications, allergies reviewed as noted. Physical Examination:     Well-developed well-nourished patient alert oriented in no acute distress.   Exam performed, including scalp, head, neck, face,nails, hair, external eyes, including conjunctival mucosa, eyelids, lips external ears , arms, digits,palms. Multiple light to medium brown, well marginated, uniformly pigmented, macules and papules 6 mm and less scattered on exam. pigmented lesions examined with dermoscopy benign-appearing patterns. Waxy tannish keratotic papules scattered, cherry-red vascular papules scattered. See map today's date for lesions noted . See assessment and plan below for specific lesions. Otherwise remarkable for lesions as noted on map. See A/P  below for additional information:    Assessment / plan:    No orders of the defined types were placed in this encounter. Meds & Refills for this Visit:  Requested Prescriptions      No prescriptions requested or ordered in this encounter         Encounter Diagnoses   Name Primary? Actinic keratosis Yes    Seborrheic keratoses     Multiple melanocytic nevi     Personal history of skin cancer     Dermatofibroma     Benign neoplasm of skin, unspecified location     History of dysplastic nevus     History of actinic keratoses      Actinic Keratoses. Precancerous nature discussed. Sun protection, sunscreen/ blocks encouraged . Monitoring for new lesions. Sun damage additional recurrent and new actinic keratoses, skin cancers may occur in areas of prior actinic keratoses, related to past sun exposure to minimize current sun exposure. Sunscreen applied consistently regularly, reapplication and sun protection while driving recommended. Extensive benign lesions no other new suspicious lesions  Right cheek lesion benign seborrheic keratosis, trial of cryo,  Retinol    Extensive lentigines keratoses many DSA P like lesions over the legs and arms  Reassurance  Alphahydroxy acids including lactic acid, glycolic acid, urea, salicylic acid may be helpful.   Suggest over-the-counter products such as Goldbond psoriasis cream 3% salicylic acid, rough and bumpy, Cerave SA cream, AmLactin, and others as available with these ingredients. Benign keratoses on shoulder reassurance    Medium brown nevi benign patterns on dermoscopy at left lateral upper arm observe carefully 4 mm lesion, medium brown lesion at left upper abdomen    Splotchy dyschromia consistent with actinic damage over the forearms  Please refer to map for specific lesions. See additional diagnoses. Pros cons of various therapies, risks benefits discussed. Pathophysiology discussed with patient. Therapeutic options reviewed. See  Medications in grid. Instructions reviewed at length. Benign nevi, seborrheic  keratoses, cherry angiomas:  Reassurance regarding other benign skin lesions. Signs and symptoms of skin cancer, ABCDE's of melanoma discussed with patient. Sunscreen use, sun protection, self exams reviewed. Followup as noted RTC ---routine checkup    6 mos -one year or p.r.n. Encounter Times   Including precharting, reviewing chart, prior notes obtaining history: 10 minutes, medical exam :10 minutes, notes on body map, plan, counseling 10minutes My total time spent caring for the patient on the day of the encounter: 30 minutes     The patient indicates understanding of these issues and agrees to the plan. The patient is asked to return as noted in follow-up/ above. This note was generated using Dragon voice recognition software. Please contact me regarding any confusion resulting from errors in recognition. Nishant Barrow

## 2024-03-12 ENCOUNTER — HOSPITAL ENCOUNTER (OUTPATIENT)
Dept: BONE DENSITY | Facility: HOSPITAL | Age: 59
Discharge: HOME | End: 2024-03-12
Attending: INTERNAL MEDICINE
Payer: COMMERCIAL

## 2024-03-12 DIAGNOSIS — Z78.0 POST-MENOPAUSAL: ICD-10-CM

## 2024-03-12 PROCEDURE — 77080 DXA BONE DENSITY AXIAL: CPT | Performed by: INTERNAL MEDICINE

## 2024-03-14 ENCOUNTER — OFFICE VISIT (OUTPATIENT)
Dept: DERMATOLOGY CLINIC | Facility: CLINIC | Age: 59
End: 2024-03-14
Payer: COMMERCIAL

## 2024-03-14 DIAGNOSIS — Z08 ENCOUNTER FOR FOLLOW-UP SURVEILLANCE OF SKIN CANCER: ICD-10-CM

## 2024-03-14 DIAGNOSIS — D23.9 DERMATOFIBROMA: ICD-10-CM

## 2024-03-14 DIAGNOSIS — L57.0 ACTINIC KERATOSIS: ICD-10-CM

## 2024-03-14 DIAGNOSIS — L82.1 SEBORRHEIC KERATOSES: ICD-10-CM

## 2024-03-14 DIAGNOSIS — D22.9 MULTIPLE MELANOCYTIC NEVI: ICD-10-CM

## 2024-03-14 DIAGNOSIS — Z86.018 HISTORY OF DYSPLASTIC NEVUS: ICD-10-CM

## 2024-03-14 DIAGNOSIS — D48.5 NEOPLASM OF UNCERTAIN BEHAVIOR OF SKIN: Primary | ICD-10-CM

## 2024-03-14 DIAGNOSIS — Z85.828 ENCOUNTER FOR FOLLOW-UP SURVEILLANCE OF SKIN CANCER: ICD-10-CM

## 2024-03-14 DIAGNOSIS — D23.9 BENIGN NEOPLASM OF SKIN, UNSPECIFIED LOCATION: ICD-10-CM

## 2024-03-14 DIAGNOSIS — Z85.828 PERSONAL HISTORY OF SKIN CANCER: ICD-10-CM

## 2024-03-14 PROCEDURE — 11103 TANGNTL BX SKIN EA SEP/ADDL: CPT | Performed by: DERMATOLOGY

## 2024-03-14 PROCEDURE — 88305 TISSUE EXAM BY PATHOLOGIST: CPT | Performed by: DERMATOLOGY

## 2024-03-14 PROCEDURE — 11102 TANGNTL BX SKIN SINGLE LES: CPT | Performed by: DERMATOLOGY

## 2024-03-14 PROCEDURE — 99213 OFFICE O/P EST LOW 20 MIN: CPT | Performed by: DERMATOLOGY

## 2024-03-19 NOTE — PROGRESS NOTES
The pathology report from last visit showed  A.  Skin, central chest near sternal notch, shave biopsy:  -Lichenoid actinic keratosis.  Recommend imiquimod to base of lesion once biopsy site has healed twice a week for 6 weeks.  Please review application instructions for patient. 1/2 packet should be sufficient for this area. Recheck in 6 months no further surgery    B.  Skin, right extensor forearm, shave biopsy:  -Verruca vulgaris.  No additional treatment  Please log in test results.  Please call patient and inform of results and recommendations.  (please add to history).  Pt to  rtc     or prn.

## 2024-03-24 NOTE — PROGRESS NOTES
Operative Report                     Shave/  Tangential biopsy     Clinical diagnosis:    Size of lesion:    Location:Pt with changing lesions A: central chest near sternal notch, pink tach 1.5cm r/o superficial bcc vs ak vs other B: right extensor forearm keratotic papule 6mm tan brown r/o SCC,     Procedure:    With patient in appropriate position the skin of the above was scrubbed with alcohol.  Anesthesia was obtained with 1% Xylocaine with epinephrine.  The skin surrounding the lesion was placed under tension and the lesion was incised using a #15 scalpel blade.  The specimen was sent for histopathologic exam.    Hemostasis was obtained with electrocautery/aluminum chloride.  Estimated blood loss less than 2 cc.    Biopsy dressed with Polysporin, bandage.    Pressure dressing:   No    Complications: None    Written instructions given and reviewed with patient    Await pathology    Contact information reviewed.    Procedural physician:  Gabriela Fontenot MD

## 2024-03-24 NOTE — PROGRESS NOTES
Barb Baptiste is a 58 year old female.  HPI:     CC:    Chief Complaint   Patient presents with    Lesion     Hx of Aks and SCC.  LOV 12/2023.  Pt presents for lesion of concern to the right lower arm, previous cryo but now back.  Denies bleeding or pain.  Lesion of concern to the mid chest, denies bleeding or pain.          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    Central chest near sternal notch    Ocular migraine 2009    OS    Osteoarthritis of first metatarsophalangeal (MTP) joint of right foot 02/15/2021    SCCA (squamous cell carcinoma) of skin 2015 2015    Seasonal allergies       Past Surgical History:   Procedure Laterality Date    COLONOSCOPY  2010    COLONOSCOPY N/A 01/30/2018    Procedure: COLONOSCOPY;  Surgeon: Mile Quintanilla MD;  Location: [facility] ENDO    HC SURGERY CATHETER EPS DX/ABLATION OTHER THAN 3D       hand surgery    LUMPECTOMY RIGHT      KEIRA BIOPSY STEREO NODULE 1 SITE LEFT (CPT=19081)  12/03/2019    calcs    KEIRA BIOPSY STEREO NODULE 1 SITE RIGHT (CPT=19081)  12/03/2019    calcification    KEIRA BIOPSY STEREO NODULE 1 SITE RIGHT (CPT=19081)  12/13/2019    calcs    RADIATION RIGHT      02/ & 03/2020    TONSILLECTOMY      UPPER GI ENDOSCOPY,BIOPSY  2010    EGD with biopsy      Family History   Problem Relation Age of Onset    Breast Cancer Self 54    Squamous Cell Self 49        arm    Breast Cancer Mother 72    Other (possible hx of skin cancer) Brother     Colon Cancer Maternal Grandmother 67        Rectal cancer     Prostate Cancer Maternal Grandfather 78    Stroke Maternal Grandfather         CVA    Hypertension Paternal Grandmother     Neurological Disorder Paternal Grandmother         Alzheimer's disease    Heart Disease Paternal Grandfather         CAD    Uterine Cancer Maternal Aunt 70    Cancer Paternal Uncle         unknown type    Colon Cancer Maternal Great-Grandmother 20        in her 20's and  of disease      Social History     Socioeconomic History    Marital status:    Tobacco Use    Smoking status: Never    Smokeless tobacco: Never   Vaping Use    Vaping Use: Never used   Substance and Sexual Activity    Alcohol use: Yes     Comment: Weekly Wine 5 drinks;     Drug use: Never    Sexual activity: Yes   Other Topics Concern    Caffeine Concern Yes     Comment: Coffee, 3 cups daily;     [place] up on a farm No    History of tanning Yes    Outdoor occupation No    Pt has a pacemaker No    Pt has a defibrillator No    Breast feeding No    Reaction to local anesthetic No        Current Outpatient Medications   Medication Sig Dispense Refill    ibuprofen 600 MG Oral Tab       venlafaxine ER 37.5 MG Oral Capsule SR 24 Hr Take 1 capsule (37.5 mg total) by mouth daily. 90 capsule 3    anastrozole 1 MG Oral Tab tab Take 1 tablet (1 mg total) by mouth daily. 90 tablet 3    fexofenadine 180 MG Oral Tab       KRILL OIL OR Take by mouth daily.      Imiquimod 5 % External Cream Apply topically 2 times every week to affected area(s). 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    Central chest near sternal notch    Ocular migraine 2009    OS    Osteoarthritis of first metatarsophalangeal (MTP) joint of right foot 02/15/2021    SCCA (squamous cell carcinoma) of skin 2015 2015    Seasonal allergies      Past Surgical History:   Procedure Laterality Date    COLONOSCOPY  2010    COLONOSCOPY N/A 01/30/2018    Procedure: COLONOSCOPY;  Surgeon: Mile Quintanilla MD;  Location: [facility] ENDO    HC SURGERY CATHETER EPS DX/ABLATION OTHER THAN 3D       hand surgery    LUMPECTOMY RIGHT      KEIRA BIOPSY STEREO NODULE 1 SITE LEFT (CPT=19081)  12/03/2019    calcs    KEIRA BIOPSY STEREO NODULE 1 SITE RIGHT (CPT=19081)  12/03/2019    calcification    KEIRA BIOPSY STEREO NODULE 1 SITE RIGHT (CPT=19081)  12/13/2019    calcs    RADIATION RIGHT      02/ & 03/2020    TONSILLECTOMY      UPPER GI ENDOSCOPY,BIOPSY  2010    EGD with biopsy     Social History     Socioeconomic History    Marital status:      Spouse name: Not on file    Number of children: Not on file    Years of education: Not on file    Highest education level: Not on file   Occupational History    Not on file   Tobacco Use    Smoking status: Never    Smokeless tobacco: Never   Vaping Use    Vaping Use: Never used   Substance and Sexual Activity    Alcohol use: Yes     Comment: Weekly Wine 5 drinks;     Drug use: Never    Sexual activity: Yes   Other Topics Concern     Service Not Asked    Blood Transfusions Not Asked    Caffeine Concern Yes     Comment: Coffee, 3 cups daily;     Occupational Exposure Not Asked    Hobby Hazards Not Asked    Sleep Concern Not Asked    Stress Concern Not Asked    Weight Concern Not Asked    Special Diet Not Asked    Back Care Not Asked    Exercise Not Asked    Bike Helmet Not Asked    Seat Belt Not Asked    Self-Exams Not Asked    Grew up on a farm No    History of tanning Yes    Outdoor occupation No    Pt has a pacemaker  No    Pt has a defibrillator No    Breast feeding No    Reaction to local anesthetic No   Social History Narrative    Not on file     Social Determinants of Health     Financial Resource Strain: Not on file   Food Insecurity: Not on file   Transportation Needs: Not on file   Physical Activity: Not on file   Stress: Not on file   Social Connections: Not on file   Housing Stability: Not on file     Family History   Problem Relation Age of Onset    Breast Cancer Self 54    Squamous Cell Self 49        arm    Breast Cancer Mother 72    Other (possible hx of skin cancer) Brother     Colon Cancer Maternal Grandmother 67        Rectal cancer    Prostate Cancer Maternal Grandfather 78    Stroke Maternal Grandfather         CVA    Hypertension Paternal Grandmother     Neurological Disorder Paternal Grandmother         Alzheimer's disease    Heart Disease Paternal Grandfather         CAD    Uterine Cancer Maternal Aunt 70    Cancer Paternal Uncle         unknown type    Colon Cancer Maternal Great-Grandmother 20        in her 20's and  of disease       There were no vitals filed for this visit.    HPI:  Chief Complaint   Patient presents with    Lesion     Hx of Aks and SCC.  LOV 2023.  Pt presents for lesion of concern to the right lower arm, previous cryo but now back.  Denies bleeding or pain.  Lesion of concern to the mid chest, denies bleeding or pain.        Follow-up history actinic keratosis, dysplastic nevi, SCC.  Patient was in [place] [facility] patient's daughter [employer] stationed in [place] [facility] .will be transferred to [place] now engaged.  Patient does plan to travel to [place]-son just - patient presents with concerns above.    Patient has been in their usual state of health.     Past notes/ records and appropriate/relevant lab results including pathology and past body maps reviewed. Including outside notes/ PCP notes as appropriate. Updated and new information noted in current visit.     ROS:  Denies other relevant  systemic complaints.      History, medications, allergies reviewed as noted.       Physical Examination:     Well-developed well-nourished patient alert oriented in no acute distress.  Exam performed, including scalp, head, neck, face,nails, hair, external eyes, including conjunctival mucosa, eyelids, lips external ears , arms, digits,palms.     Multiple light to medium brown, well marginated, uniformly pigmented, macules and papules 6 mm and less scattered on exam. pigmented lesions examined with dermoscopy benign-appearing patterns.     Waxy tannish keratotic papules scattered, cherry-red vascular papules scattered.    See map today's date for lesions noted .  See assessment and plan below for specific lesions.    Otherwise remarkable for lesions as noted on map.    See A/P  below for additional information:    Assessment / plan:    Orders Placed This Encounter   Procedures    Specimen to Pathology, Tissue [[facility] Pt to [facility] lab]       Meds & Refills for this Visit:  Requested Prescriptions      No prescriptions requested or ordered in this encounter         Encounter Diagnoses   Name Primary?    Neoplasm of uncertain behavior of skin Yes    Actinic keratosis     Seborrheic keratoses     Multiple melanocytic nevi     Personal history of skin cancer     Dermatofibroma     Benign neoplasm of skin, unspecified location     History of dysplastic nevus     Encounter for follow-up surveillance of skin cancer            Pt with changing lesions A: central chest near sternal notch, pink tach 1.5cm r/o superficial bcc vs ak vs other B: right extensor forearm keratotic papule 6mm tan brown r/o SCC,   Shave/ tangential biopsy performed, operative note and consent in chart further plans pending pathology  X2    Actinic Keratoses.  Precancerous nature discussed. Sun protection, sunscreen/ blocks encouraged .  Monitoring for new lesions.  Sun damage additional recurrent and new actinic keratoses, skin cancers may occur in areas  of prior actinic keratoses, related to past sun exposure to minimize current sun exposure.  Sunscreen applied consistently regularly, reapplication and sun protection while driving recommended.      Extensive benign lesions no other new suspicious lesions  Right cheek lesion benign seborrheic keratosis, trial of cryo,  Retinol    Extensive lentigines keratoses many DSA P like lesions over the legs and arms  Reassurance  Alphahydroxy acids including lactic acid, glycolic acid, urea, salicylic acid may be helpful.  Suggest over-the-counter products such as Goldbond psoriasis cream 3% salicylic acid, rough and bumpy, Cerave SA cream, AmLactin, and others as available with these ingredients.    Benign keratoses on shoulder reassurance    Medium brown nevi benign patterns on dermoscopy at left lateral upper arm observe carefully 4 mm lesion, medium brown lesion at left upper abdomen    Splotchy dyschromia consistent with actinic damage over the forearms  Please refer to map for specific lesions.  See additional diagnoses.  Pros cons of various therapies, risks benefits discussed.Pathophysiology discussed with patient.  Therapeutic options reviewed.  See  Medications in grid.  Instructions reviewed at length.    Benign nevi, seborrheic  keratoses, cherry angiomas:  Reassurance regarding other benign skin lesions.Signs and symptoms of skin cancer, ABCDE's of melanoma discussed with patient. Sunscreen use, sun protection, self exams reviewed.  Followup as noted RTC ---routine checkup    6 mos -one year or p.r.n.    Encounter Times   Including precharting, reviewing chart, prior notes obtaining history: 10 minutes, medical exam :10 minutes, notes on body map, plan, counseling 10minutes My total time spent caring for the patient on the day of the encounter: 30 minutes     The patient indicates understanding of these issues and agrees to the plan.  The patient is asked to return as noted in follow-up/ above.    This note was  generated using Dragon voice recognition software.  Please contact me regarding any confusion resulting from errors in recognition..

## 2024-03-26 RX ORDER — VENLAFAXINE HYDROCHLORIDE 37.5 MG/1
37.5 CAPSULE, EXTENDED RELEASE ORAL DAILY
Qty: 90 CAPSULE | Refills: 3 | Status: SHIPPED | OUTPATIENT
Start: 2024-03-26

## 2024-04-10 ENCOUNTER — OFFICE VISIT (OUTPATIENT)
Dept: HEMATOLOGY/ONCOLOGY | Facility: HOSPITAL | Age: 59
End: 2024-04-10
Attending: INTERNAL MEDICINE
Payer: COMMERCIAL

## 2024-04-10 VITALS
RESPIRATION RATE: 16 BRPM | DIASTOLIC BLOOD PRESSURE: 85 MMHG | TEMPERATURE: 98 F | BODY MASS INDEX: 29.73 KG/M2 | WEIGHT: 185 LBS | HEART RATE: 62 BPM | SYSTOLIC BLOOD PRESSURE: 142 MMHG | HEIGHT: 66 IN | OXYGEN SATURATION: 97 %

## 2024-04-10 DIAGNOSIS — D05.11 BREAST NEOPLASM, TIS (DCIS), RIGHT: Primary | ICD-10-CM

## 2024-04-10 DIAGNOSIS — Z79.811 ENCOUNTER FOR MONITORING AROMATASE INHIBITOR THERAPY: ICD-10-CM

## 2024-04-10 DIAGNOSIS — Z51.81 ENCOUNTER FOR MONITORING AROMATASE INHIBITOR THERAPY: ICD-10-CM

## 2024-04-10 DIAGNOSIS — Z12.31 SCREENING MAMMOGRAM FOR BREAST CANCER: ICD-10-CM

## 2024-04-10 PROCEDURE — 99214 OFFICE O/P EST MOD 30 MIN: CPT | Performed by: NURSE PRACTITIONER

## 2024-04-10 NOTE — PROGRESS NOTES
HPI   Barb Baptiste is a 58 year old female here for f/u of   Encounter Diagnoses   Name Primary?    Breast neoplasm, Tis (DCIS), right Yes    Encounter for monitoring aromatase inhibitor therapy        Compliance with SBE: Yes. Changes on SBE: No.  R breast really sore at times, states closer to surgical bed.    Compliance with Anastrozole:  compliance all of the time  Side effects from anastrozole: Yes hot flashes, rare, Yes arthralgias or myalgias that are mild.  Other:  No  Hot flashes improved with venlafaxine.    Migraines mostly resolved.     ECOG PS: 0      Review of Systems:     Review of Systems   Constitutional:  Negative for appetite change, fatigue and unexpected weight change.   Respiratory:  Negative for cough and shortness of breath.    Cardiovascular:  Negative for chest pain.   Gastrointestinal:  Negative for abdominal pain.   Endocrine: Positive for hot flashes (not as frequent and not as intense).   Musculoskeletal:  Positive for arthralgias (in am). Negative for back pain.        No bone pains   Neurological:  Negative for dizziness and headaches.   Hematological:  Negative for adenopathy.   Psychiatric/Behavioral:  Positive for sleep disturbance (not this week.).          Current Outpatient Medications   Medication Sig Dispense Refill    venlafaxine ER 37.5 MG Oral Capsule SR 24 Hr Take 1 capsule (37.5 mg total) by mouth daily. 90 capsule 3    ibuprofen 600 MG Oral Tab       anastrozole 1 MG Oral Tab tab Take 1 tablet (1 mg total) by mouth daily. 90 tablet 3    fexofenadine 180 MG Oral Tab       KRILL OIL OR Take by mouth daily.      Imiquimod 5 % External Cream Apply topically 2 times every week to affected area(s). (Patient not taking: Reported on 4/10/2024) 6 each 0     Allergies:   Allergies   Allergen Reactions    [place] HIVES     Adhesive Glue - rash        Past Medical History:    Actinic keratosis    Left arm-Focal actinic keratosis with perifollicular fibrosis suggestive of  ruptured cyst    Anemia, iron deficiency    Atypical nevus    skin of left medial ankle    Breast cancer (HCC)    right breast    Breast neoplasm, Tis (DCIS), right    Gastritis    History of dysplastic nevus    Internal hemorrhoids    Lichenoid actinic keratosis    Central chest near sternal notch    Ocular migraine    OS    Osteoarthritis of first metatarsophalangeal (MTP) joint of right foot    SCCA (squamous cell carcinoma) of skin    2015    Seasonal allergies     Past Surgical History:   Procedure Laterality Date    Colonoscopy  2010    Colonoscopy N/A 01/30/2018    Procedure: COLONOSCOPY;  Surgeon: Mile Quintanilla MD;  [facility]    Hc surgery catheter eps dx/ablation other than 3d c1733      hand surgery    Lumpectomy right      [place] biopsy stereo nodule 1 site left (cpt=19081)  12/03/2019    calcs    [place] biopsy stereo nodule 1 site right (cpt=19081)  12/03/2019    calcification    [place] biopsy stereo nodule 1 site right (cpt=19081)  12/13/2019    calcs    Radiation right      02/ & 03/2020    Tonsillectomy      Upper gi endoscopy,biopsy  2010    EGD with biopsy     Social History     Socioeconomic History    Marital status:    Tobacco Use    Smoking status: Never    Smokeless tobacco: Never   Vaping Use    Vaping status: Never Used   Substance and Sexual Activity    Alcohol use: Yes     Comment: Weekly Wine 5 drinks;     Drug use: Never    Sexual activity: Yes   Other Topics Concern    Caffeine Concern Yes     Comment: Coffee, 3 cups daily;     [place] a farm No    History of tanning Yes    Outdoor occupation No    Pt has a pacemaker No    Pt has a defibrillator No    Breast feeding No    Reaction to local anesthetic No       Family History   Problem Relation Age of Onset    Breast Cancer Self 54    Squamous Cell Self 49        arm    Breast Cancer Mother 72    Other (possible hx of skin cancer) Brother     Colon Cancer Maternal Grandmother 67        Rectal cancer    Prostate  Cancer Maternal Grandfather 78    Stroke Maternal Grandfather         CVA    Hypertension Paternal Grandmother     Neurological Disorder Paternal Grandmother         Alzheimer's disease    Heart Disease Paternal Grandfather         CAD    Uterine Cancer Maternal Aunt 70    Cancer Paternal Uncle         unknown type    Colon Cancer Maternal Great-Grandmother 20        in her 20's and  of disease         PHYSICAL EXAM:    /85 (BP Location: Left arm, Patient Position: Sitting, Cuff Size: adult)   Pulse 62   Temp 98.3 °F (36.8 °C) (Oral)   Resp 16   Ht 1.676 m (5' 6\")   Wt 83.9 kg (185 lb)   LMP 2016   SpO2 97%   BMI 29.86 kg/m²   Wt Readings from Last 6 Encounters:   04/10/24 83.9 kg (185 lb)   10/26/23 83.4 kg (183 lb 12.8 oz)   10/19/23 84 kg (185 lb 3.2 oz)   10/05/23 83 kg (183 lb)   23 82.2 kg (181 lb 3.2 oz)   23 83 kg (183 lb)     Physical Exam  General: Patient is alert, not in acute distress.  HEENT: EOMs intact. PERRL.   Neck: No JVD. No palpable lymphadenopathy. Neck is supple.  Chest: Clear to auscultation.     Breasts:  R breast lumpectomy scar.  No breast masses on either breast.  Heart: Regular rate and rhythm.   Abdomen: Soft, non tender with good bowel sounds.  Extremities: No edema.  Neurological: Grossly intact.   Lymphatics: There is no palpable lymphadenopathy throughout in the cervical, supraclavicular, axillary, or inguinal regions.  Psych/Depression: nl        ASSESSMENT/PLAN:     Encounter Diagnoses   Name Primary?    Breast neoplasm, Tis (DCIS), right Yes    Encounter for monitoring aromatase inhibitor therapy        DCIS of the right breast ER 5% WA 2%.     --Discussed with the patient that prognosis is excellent and that main goal of treatment is for local control.     --She completed local control with surgery with lumpectomy.       --She has completed RT     --On anastrozole for 5 years to complete treatment in 2025.     --AI hot flashes: On  venlafaxine.    --Mammogram in Sept 2023, BIRADS-2, next due Sept of 2024.Orders given     --Will follow up in 6 months.     --Baseline DEXA was normal it was done March of 2024.  Will have a repeat to assess her bone health in March of 2026.        MDM moderate.    No orders of the defined types were placed in this encounter.      Results From Past 48 Hours:  No results found for this or any previous visit (from the past 48 hour(s)).      Imaging & Referrals:  None   No orders of the defined types were placed in this encounter.      Narrative   PROCEDURE: XR DEXA BONE DENSITOMETRY (CPT=77080)     COMPARISON: [facility], XR DEXA BONE DENSITOMETRY (CPT=77080), 3/10/2022, 12:14 PM.     INDICATIONS: Z78.0 Post-menopausal     TECHNIQUE: Measurement of bone mineral density of the lumbar spine and hip was performed on a Hologic dual energy x-ray absorptiometry scanner.     FINDINGS:     LEFT FEMORAL NECK  BMD: 0.810 gm/sq. cm. T SCORE: -0.3 Z SCORE: 0.9     LEFT TOTAL HIP  BMD: 1.012 gm/sq. cm. T SCORE: 0.6 Z SCORE: 1.4     PA LUMBAR SPINE (L1 - L4)  BMD: 1.121 gm/sq. cm. T SCORE: 0.7 Z SCORE: 2.0        T scores are a comparison to sex-matched patients with mean peak bone mass and are given in standard deviation (s.d.).  Each 1 s.d. corresponds to approximately 10% below peak normal bone density.       WORLD HEALTH ORGANIZATION CRITERIA  NORMAL T SCORE: Above -1 s.d.    OSTEOPENIA T SCORE: Between -1 and -2.5 s.d.    OSTEOPOROSIS T SCORE: -2.5 s.d.     National Osteoporosis Foundation Clinician's Guide to Prevention and Treatment of Osteoporosis recommendations for treatment:  Post menopausal women and men age 50 and older presenting with the following should be considered for treatment:  * A hip or vertebral (clinical or morphometric) fracture  * T score < -2.5 at the femoral neck or spine after appropriate evaluation to exclude secondary causes.  * Low bone mass (T score between -1.0 and -2.5  at the femoral neck or spine) and a 10-year probability of a hip fracture > 3% or a 10-year probability of a major osteoporosis-related fracture > 20% based on the [place]-adapted WHO algorithm               Impression   CONCLUSION:  1. Findings in the left femoral neck are in the normal range, and there is no increased fracture risk.  There has been decrease in the BMD by 4.9% from previous study.  Findings in the total left hip are in the normal range, and there is no increased  fracture risk.  There has been decrease in the BMD by 4.8% from previous study.  The 10 year fracture risk for major osteoporotic fracture is 21%, and for hip fracture is 0.3%.  2. Findings in the total AP lumbar spine are in the normal range, and there is no increased fracture risk.  There has been decrease in the BMD by 0.6% from previous study.           Dictated by (CST): Rick Herrera MD on 3/12/2024 at 6:16 PM      Finalized by (CST): Rick Herrera MD on 3/12/2024 at 6:17 PM           PROCEDURE: [facility] MALLORIE 2D+3D SCREENING BILAT (85916/35940)     COMPARISON: [facility] MALLORIE 2D+3D DIAGNOSTIC KEIRA RIGHT (SCG=[id number]), 3/10/2022, 12:51 PM.  [facility] MALLORIE 2D+3D DIAGNOSTIC KEIRA BILAT (THN=[id number]), 9/15/2021, 11:44 AM.  [facility], [facility] MALLORIE 2D+3D DIAGNOSTIC KEIAR RIGHT (AQZ=[id number]), 3/09/2021, 1:19 PM.  [facility] MALLORIE 2D+3D DIAGNOSTIC KEIRA BILAT (CPT=77066/48109), 9/17/2020, 1:19 PM.  [facility]  SURGICAL SPECIMEN RIGHT EM (CPT=76098), 1/13/2020, 1:43 PM.  [facility] LOCALIZATION WIRE 2 SITE RIGHT (CPT=19281/54409), 1/13/2020, 1:12 PM.  [facility] POST PROCEDURAL IMAGE RIGHT (CPT=77065),  12/13/2019, 10:25 AM.  [facility], [facility] BIOPSY STEREO W/MALLORIE 3D 1 SITE RIGHT(CPT=19081/20542), 12/13/2019, 10:01 AM.  [facility]  [facility] POST PROCEDURAL IMAGE LEFT (CPT=77065), 12/03/2019, 11:52 AM.  [facility] POST PROCEDURAL IMAGE RIGHT (CPT=77065), 12/03/2019, 11:47 AM.  [facility] BIOPSY STEREO W/MALLORIE 3D 1 SITE LEFT(CPT=19081/89402), 12/03/2019, 11:21 AM.  [facility] BIOPSY STEREO W/MALLORIE 3D 1 SITE RIGHT(CPT=19081/37806), 12/03/2019, 10:34 AM.  [facility] MALLORIE 2D+3D DIAGNOSTIC ADDL VWS  BILAT (VMT=72288/93096), 11/21/2019, 7:55 AM.  [facility] MALLORIE 2D+3D  SCREENING BILAT (82371/29934), 11/18/2019, 12:11 PM.  [facility] SCREENING BILAT (CPT=77067), 12/11/2017, 2:14 PM.  [facility] MALLORIE 2D+3D SCREENING BILAT (18728/09244), 9/15/2022, 1:46 PM.     INDICATIONS: Z12.31 Screening mammogram for breast cancer.  History of ductal carcinoma in situ of the right breast status post lumpectomy on 01/13/2020.  History of radiation therapy and tamoxifen use.  History of a benign left breast stereotactic  biopsy in December 2019. Family history of breast cancer in her mother at age 72. No current complaints.       TECHNIQUE: Full field direct screening mammography was performed and images were reviewed with the [employer] SANDY 1.5.1.5 CAD device.  3D tomosynthesis was performed and reviewed        BREAST COMPOSITION:   Category b-Scattered areas fibroglandular density.        FINDINGS:  There is stable architectural distortion in the right breast with associated surgical clips and an overlying radiopaque scar marker consistent with the lumpectomy site.     A core biopsy clip is again noted in the upper outer left breast.     There are no masses, asymmetries or suspicious calcifications.                  Impression:     CONCLUSION:   No mammographic evidence for malignancy.  As long as the patient's clinical breast exam remains unchanged,  annual screening mammogram is recommended.        BI-RADS CATEGORY:    DIAGNOSTIC CATEGORY 2--BENIGN FINDING:       RECOMMENDATIONS:  ROUTINE MAMMOGRAM AND CLINICAL EVALUATION IN 12 MONTHS.                   PLEASE NOTE: NORMAL MAMMOGRAM DOES NOT EXCLUDE THE POSSIBILITY OF BREAST CANCER.  A CLINICALLY SUSPICIOUS PALPABLE LUMP SHOULD BE BIOPSIED.       For patients over the age of 40, the target due date for the patient's next mammogram has been entered into a reminder system.       Patient received a discharge summary from the technologist after completion of exam.     Breast marker legend used on images    Triangle = Palpable lump  [place] = Skin tag or mole  BB = Nipple  Linear david = Scar  Square = Pain           Dictated by (CST): Herrera Cedeno MD on 9/28/2023 at 9:05 AM      Finalized by (CST): Herrera Cedeno MD on 9/28/2023 at 9:08 AM

## 2024-05-19 RX ORDER — ANASTROZOLE 1 MG/1
1 TABLET ORAL DAILY
Qty: 90 TABLET | Refills: 3 | Status: SHIPPED | OUTPATIENT
Start: 2024-05-19

## 2024-10-02 ENCOUNTER — OFFICE VISIT (OUTPATIENT)
Dept: DERMATOLOGY CLINIC | Facility: CLINIC | Age: 59
End: 2024-10-02
Payer: COMMERCIAL

## 2024-10-02 ENCOUNTER — HOSPITAL ENCOUNTER (OUTPATIENT)
Dept: MAMMOGRAPHY | Age: 59
Discharge: HOME | End: 2024-10-02
Attending: NURSE PRACTITIONER
Payer: COMMERCIAL

## 2024-10-02 DIAGNOSIS — L82.1 SEBORRHEIC KERATOSES: ICD-10-CM

## 2024-10-02 DIAGNOSIS — Z86.018 HISTORY OF DYSPLASTIC NEVUS: ICD-10-CM

## 2024-10-02 DIAGNOSIS — D22.9 MULTIPLE MELANOCYTIC NEVI: ICD-10-CM

## 2024-10-02 DIAGNOSIS — L57.0 ACTINIC KERATOSIS: Primary | ICD-10-CM

## 2024-10-02 DIAGNOSIS — Z12.31 SCREENING MAMMOGRAM FOR BREAST CANCER: ICD-10-CM

## 2024-10-02 DIAGNOSIS — Z13.89 ENCOUNTER FOR SURVEILLANCE OF ABNORMAL NEVI: ICD-10-CM

## 2024-10-02 DIAGNOSIS — Z08 ENCOUNTER FOR FOLLOW-UP SURVEILLANCE OF SKIN CANCER: ICD-10-CM

## 2024-10-02 DIAGNOSIS — D23.9 BENIGN NEOPLASM OF SKIN, UNSPECIFIED LOCATION: ICD-10-CM

## 2024-10-02 DIAGNOSIS — D23.9 DERMATOFIBROMA: ICD-10-CM

## 2024-10-02 DIAGNOSIS — Z85.828 ENCOUNTER FOR FOLLOW-UP SURVEILLANCE OF SKIN CANCER: ICD-10-CM

## 2024-10-02 PROCEDURE — 77063 BREAST TOMOSYNTHESIS BI: CPT | Performed by: NURSE PRACTITIONER

## 2024-10-02 PROCEDURE — 99213 OFFICE O/P EST LOW 20 MIN: CPT | Performed by: DERMATOLOGY

## 2024-10-02 PROCEDURE — 77067 SCR MAMMO BI INCL CAD: CPT | Performed by: NURSE PRACTITIONER

## 2024-10-10 ENCOUNTER — APPOINTMENT (OUTPATIENT)
Dept: HEMATOLOGY/ONCOLOGY | Facility: HOSPITAL | Age: 59
End: 2024-10-10
Attending: INTERNAL MEDICINE
Payer: COMMERCIAL

## 2024-10-13 NOTE — PROGRESS NOTES
Barb Baptiste is a 59 year old female.  HPI:     CC:    Chief Complaint   Patient presents with    Full Skin Exam     LOV 3/14/24- pt presents for full skin exam, pt denies any new lesions, has hx of Aks and SCC-          Allergies:  Peck    HISTORY:    Past Medical History:    Actinic keratosis    Left arm-Focal actinic keratosis with perifollicular fibrosis suggestive of ruptured cyst    Anemia, iron deficiency    Atypical nevus    skin of left medial ankle    Breast cancer (HCC)    right breast    Breast neoplasm, Tis (DCIS), right    Gastritis    History of dysplastic nevus    Internal hemorrhoids    Lichenoid actinic keratosis    Central chest near sternal notch    Ocular migraine    OS    Osteoarthritis of first metatarsophalangeal (MTP) joint of right foot    SCCA (squamous cell carcinoma) of skin    2015    Seasonal allergies      Past Surgical History:   Procedure Laterality Date    Colonoscopy  2010    Colonoscopy N/A 01/30/2018    Procedure: COLONOSCOPY;  Surgeon: Mile Quintanilla MD;  Location: [facility] surgery catheter eps dx/ablation other than 3d c1733      hand surgery    Lumpectomy right      [place] biopsy stereo nodule 1 site left (cpt=19081)  12/03/2019    calcs    [place] biopsy stereo nodule 1 site right (cpt=19081)  12/03/2019    calcification    [place] biopsy stereo nodule 1 site right (cpt=19081)  12/13/2019    calcs    Radiation right      02/ & 03/2020    Tonsillectomy      Upper gi endoscopy,biopsy  2010    EGD with biopsy      Family History   Problem Relation Age of Onset    Breast Cancer Self 54    Squamous Cell Self 49        arm    Breast Cancer Mother 72    Other (possible hx of skin cancer) Brother     Colon Cancer Maternal Grandmother 67        Rectal cancer    Prostate Cancer Maternal Grandfather 78    Stroke Maternal Grandfather         CVA    Hypertension Paternal Grandmother     Neurological Disorder Paternal Grandmother         Alzheimer's disease    Heart  Disease Paternal Grandfather         CAD    Uterine Cancer Maternal Aunt 70    Cancer Paternal Uncle         unknown type    Colon Cancer Maternal Great-Grandmother 20        in her 20's and  of disease      Social History     Socioeconomic History    Marital status:    Tobacco Use    Smoking status: Never    Smokeless tobacco: Never   Vaping Use    Vaping status: Never Used   Substance and Sexual Activity    Alcohol use: Yes     Comment: Weekly Wine 5 drinks;     Drug use: Never    Sexual activity: Yes   Other Topics Concern    Caffeine Concern Yes     Comment: Coffee, 3 cups daily;     Grew up on a farm No    History of tanning Yes    Outdoor occupation No    Pt has a pacemaker No    Pt has a defibrillator No    Breast feeding No    Reaction to local anesthetic No        Current Outpatient Medications   Medication Sig Dispense Refill    ANASTROZOLE 1 MG Oral Tab tab TAKE 1 TABLET BY MOUTH EVERY DAY 90 tablet 3    venlafaxine ER 37.5 MG Oral Capsule SR 24 Hr Take 1 capsule (37.5 mg total) by mouth daily. 90 capsule 3    ibuprofen 600 MG Oral Tab       fexofenadine 180 MG Oral Tab       KRILL OIL OR Take by mouth daily.       Allergies:   Allergies   Allergen Reactions    [place] HIVES     Adhesive Glue - rash        Past Medical History:    Actinic keratosis    Left arm-Focal actinic keratosis with perifollicular fibrosis suggestive of ruptured cyst    Anemia, iron deficiency    Atypical nevus    skin of left medial ankle    Breast cancer (HCC)    right breast    Breast neoplasm, Tis (DCIS), right    Gastritis    History of dysplastic nevus    Internal hemorrhoids    Lichenoid actinic keratosis    Central chest near sternal notch    Ocular migraine    OS    Osteoarthritis of first metatarsophalangeal (MTP) joint of right foot    SCCA (squamous cell carcinoma) of skin        Seasonal allergies     Past Surgical History:   Procedure Laterality Date    Colonoscopy      Colonoscopy N/A 2018     Procedure: COLONOSCOPY;  Surgeon: Mile Quintanilla MD;  Location: [facility] ENDO    Hc surgery catheter eps dx/ablation other than 3d c1733      hand surgery    Lumpectomy right      [place] biopsy stereo nodule 1 site left (cpt=19081)  12/03/2019    calcs    [place] biopsy stereo nodule 1 site right (cpt=19081)  12/03/2019    calcification    [place] biopsy stereo nodule 1 site right (cpt=19081)  12/13/2019    calcs    Radiation right      02/ & 03/2020    Tonsillectomy      Upper gi endoscopy,biopsy  2010    EGD with biopsy     Social History     Socioeconomic History    Marital status:      Spouse name: Not on file    Number of children: Not on file    Years of education: Not on file    Highest education level: Not on file   Occupational History    Not on file   Tobacco Use    Smoking status: Never    Smokeless tobacco: Never   Vaping Use    Vaping status: Never Used   Substance and Sexual Activity    Alcohol use: Yes     Comment: Weekly Wine 5 drinks;     Drug use: Never    Sexual activity: Yes   Other Topics Concern     Service Not Asked    Blood Transfusions Not Asked    Caffeine Concern Yes     Comment: Coffee, 3 cups daily;     Occupational Exposure Not Asked    Hobby Hazards Not Asked    Sleep Concern Not Asked    Stress Concern Not Asked    Weight Concern Not Asked    Special Diet Not Asked    Back Care Not Asked    Exercise Not Asked    Bike Helmet Not Asked    Seat Belt Not Asked    Self-Exams Not Asked    Grew up on a farm No    History of tanning Yes    Outdoor occupation No    Pt has a pacemaker No    Pt has a defibrillator No    Breast feeding No    Reaction to local anesthetic No   Social History Narrative    Not on file     Social Drivers of Health     Financial Resource Strain: Not on file   Food Insecurity: Not on file   Transportation Needs: Not on file   Physical Activity: Not on file   Stress: Not on file   Social Connections: Not on file   Housing Stability: Not on file     Family  History   Problem Relation Age of Onset    Breast Cancer Self 54    Squamous Cell Self 49        arm    Breast Cancer Mother 72    Other (possible hx of skin cancer) Brother     Colon Cancer Maternal Grandmother 67        Rectal cancer    Prostate Cancer Maternal Grandfather 78    Stroke Maternal Grandfather         CVA    Hypertension Paternal Grandmother     Neurological Disorder Paternal Grandmother         Alzheimer's disease    Heart Disease Paternal Grandfather         CAD    Uterine Cancer Maternal Aunt 70    Cancer Paternal Uncle         unknown type    Colon Cancer Maternal Great-Grandmother 20        in her 20's and  of disease       There were no vitals filed for this visit.    HPI:  Chief Complaint   Patient presents with    Full Skin Exam     LOV 3/14/24- pt presents for full skin exam, pt denies any new lesions, has hx of Aks and SCC-        Follow-up history actinic keratosis, dysplastic nevi, SCC.  Patient's daughter now moving to [place] from [facility].  Patient presents with concerns above.    Patient has been in their usual state of health.     Past notes/ records and appropriate/relevant lab results including pathology and past body maps reviewed. Including outside notes/ PCP notes as appropriate. Updated and new information noted in current visit.     ROS:  Denies other relevant systemic complaints.      History, medications, allergies reviewed as noted.       Physical Examination:     Well-developed well-nourished patient alert oriented in no acute distress.  Exam performed, including scalp, head, neck, face,nails, hair, external eyes, including conjunctival mucosa, eyelids, lips external ears , arms, digits,palms.     Multiple light to medium brown, well marginated, uniformly pigmented, macules and papules 6 mm and less scattered on exam. pigmented lesions examined with dermoscopy benign-appearing patterns.     Waxy tannish keratotic papules scattered, cherry-red vascular papules  scattered.    See map today's date for lesions noted .  See assessment and plan below for specific lesions.    Otherwise remarkable for lesions as noted on map.    See A/P  below for additional information:    Assessment / plan:    No orders of the defined types were placed in this encounter.      Meds & Refills for this Visit:  Requested Prescriptions      No prescriptions requested or ordered in this encounter         Encounter Diagnoses   Name Primary?    Actinic keratosis Yes    Seborrheic keratoses     Multiple melanocytic nevi     Dermatofibroma     History of dysplastic nevus     Encounter for follow-up surveillance of skin cancer     Benign neoplasm of skin, unspecified location     Encounter for surveillance of abnormal nevi        Patient seen for follow-up long-term monitoring, treatment of  Actinic keratoses history dysplastic nevi, prior history of skin cancer  Plan of care:  ongoing surveillance, monitoring including regular follow-up due to longer term risk of recurrence, new lesions.  See previous notes.  There is a longitudinal care relationship with me, the care plan reflects the ongoing nature of the continuous relationship of care, and the medical record indicates that there is ongoing treatment of a serious/complex medical condition which I am currently managing.  is Applicable      Lichenoid AK central chest near sternal notch, verruca vulgaris right extensor forearm 3/24 areas healed well    AK post imiquimod clear no recurrence.  No new AK's in this area    Actinic Keratoses.  Precancerous nature discussed. Sun protection, sunscreen/ blocks encouraged .  Monitoring for new lesions.  Sun damage additional recurrent and new actinic keratoses, skin cancers may occur in areas of prior actinic keratoses, related to past sun exposure to minimize current sun exposure.  Sunscreen applied consistently regularly, reapplication and sun protection while driving recommended.    Milium extracted over  central face reassurance.  On examination multiple whitish dome-shaped smooth papules are noted.  Milia diagnosis , pathophysiology discussed.  Over-the-counter retinol products may cause peeling irritation.  Consider prescription Retin-A if worsening.    Extensive benign lesions no other new suspicious lesions  Right cheek lesion benign seborrheic keratosis, trial of cryo,  Retinol    Extensive lentigines keratoses many DSA P like lesions over the legs and arms  Reassurance  Alphahydroxy acids including lactic acid, glycolic acid, urea, salicylic acid may be helpful.  Suggest over-the-counter products such as Goldbond psoriasis cream 3% salicylic acid, rough and bumpy, Cerave SA cream, AmLactin, and others as available with these ingredients.    Benign keratoses on shoulder reassurance    Medium brown nevi benign patterns on dermoscopy at left lateral upper arm observe carefully 4 mm lesion, medium brown lesion at left upper abdomen    Splotchy dyschromia consistent with actinic damage over the forearms  Please refer to map for specific lesions.  See additional diagnoses.  Pros cons of various therapies, risks benefits discussed.Pathophysiology discussed with patient.  Therapeutic options reviewed.  See  Medications in grid.  Instructions reviewed at length.    Benign nevi, seborrheic  keratoses, cherry angiomas:  Reassurance regarding other benign skin lesions.Signs and symptoms of skin cancer, ABCDE's of melanoma discussed with patient. Sunscreen use, sun protection, self exams reviewed.  Followup as noted RTC ---routine checkup    6 mos -one year or p.r.n.    Encounter Times   Including precharting, reviewing chart, prior notes obtaining history: 10 minutes, medical exam :10 minutes, notes on body map, plan, counseling 10minutes My total time spent caring for the patient on the day of the encounter: 30 minutes     The patient indicates understanding of these issues and agrees to the plan.  The patient is asked  to return as noted in follow-up/ above.    This note was generated using Dragon voice recognition software.  Please contact me regarding any confusion resulting from errors in recognition..

## 2024-10-23 ENCOUNTER — OFFICE VISIT (OUTPATIENT)
Dept: HEMATOLOGY/ONCOLOGY | Facility: HOSPITAL | Age: 59
End: 2024-10-23
Attending: GENETIC COUNSELOR, MS
Payer: COMMERCIAL

## 2024-10-23 VITALS
DIASTOLIC BLOOD PRESSURE: 76 MMHG | HEIGHT: 66 IN | BODY MASS INDEX: 29.25 KG/M2 | RESPIRATION RATE: 18 BRPM | WEIGHT: 182 LBS | SYSTOLIC BLOOD PRESSURE: 120 MMHG | OXYGEN SATURATION: 97 % | HEART RATE: 66 BPM | TEMPERATURE: 98 F

## 2024-10-23 DIAGNOSIS — D05.11 BREAST NEOPLASM, TIS (DCIS), RIGHT: Primary | ICD-10-CM

## 2024-10-23 DIAGNOSIS — Z51.81 ENCOUNTER FOR MONITORING AROMATASE INHIBITOR THERAPY: ICD-10-CM

## 2024-10-23 DIAGNOSIS — Z79.811 ENCOUNTER FOR MONITORING AROMATASE INHIBITOR THERAPY: ICD-10-CM

## 2024-10-23 PROCEDURE — 99214 OFFICE O/P EST MOD 30 MIN: CPT | Performed by: NURSE PRACTITIONER

## 2024-10-23 NOTE — PROGRESS NOTES
HPI   Barb Baptiste is a 59 year old female here for f/u of   Encounter Diagnoses   Name Primary?    Breast neoplasm, Tis (DCIS), right Yes    Encounter for monitoring aromatase inhibitor therapy        Compliance with SBE: Yes. Changes on SBE: No.  R breast tender at times at scar.     Compliance with Anastrozole:  compliance all of the time  Side effects from anastrozole: Yes hot flashes, rare, Yes arthralgias or myalgias that are mild.  Other:  No  Hot flashes improved with venlafaxine.    Migraines mostly resolved.     ECOG PS: 0      Review of Systems:     Review of Systems   Constitutional:  Negative for appetite change, fatigue and unexpected weight change.   Respiratory:  Negative for cough and shortness of breath.    Cardiovascular:  Negative for chest pain.   Gastrointestinal:  Negative for abdominal pain.   Endocrine: Positive for hot flashes (not as frequent and not as intense).   Genitourinary:  Negative for frequency.    Musculoskeletal:  Positive for arthralgias (in am). Negative for back pain.        No bone pains   Neurological:  Negative for dizziness and headaches.   Hematological:  Negative for adenopathy.   Psychiatric/Behavioral:  Positive for sleep disturbance (not this week.).          Current Outpatient Medications   Medication Sig Dispense Refill    ANASTROZOLE 1 MG Oral Tab tab TAKE 1 TABLET BY MOUTH EVERY DAY 90 tablet 3    venlafaxine ER 37.5 MG Oral Capsule SR 24 Hr Take 1 capsule (37.5 mg total) by mouth daily. 90 capsule 3    ibuprofen 600 MG Oral Tab       fexofenadine 180 MG Oral Tab       KRILL OIL OR Take by mouth daily.       Allergies:   Allergies   Allergen Reactions    [place] HIVES     Adhesive Glue - rash        Past Medical History:    Actinic keratosis    Left arm-Focal actinic keratosis with perifollicular fibrosis suggestive of ruptured cyst    Anemia, iron deficiency    Atypical nevus    skin of left medial ankle    Breast cancer (HCC)    right breast    Breast  neoplasm, Tis (DCIS), right    Gastritis    History of dysplastic nevus    Internal hemorrhoids    Lichenoid actinic keratosis    Central chest near sternal notch    Ocular migraine    OS    Osteoarthritis of first metatarsophalangeal (MTP) joint of right foot    SCCA (squamous cell carcinoma) of skin    2015    Seasonal allergies     Past Surgical History:   Procedure Laterality Date    Colonoscopy  2010    Colonoscopy N/A 01/30/2018    Procedure: COLONOSCOPY;  Surgeon: Mile Quintanilla MD;  Location: [facility]    Hc surgery catheter eps dx/ablation other than 3d c1733      hand surgery    Lumpectomy right      [place] biopsy stereo nodule 1 site left (cpt=19081)  12/03/2019    calcs    [place] biopsy stereo nodule 1 site right (cpt=19081)  12/03/2019    calcification    [place] biopsy stereo nodule 1 site right (cpt=19081)  12/13/2019    calcs    Radiation right      02/ & 03/2020    Tonsillectomy      Upper gi endoscopy,biopsy  2010    EGD with biopsy     Social History     Socioeconomic History    Marital status:    Tobacco Use    Smoking status: Never    Smokeless tobacco: Never   Vaping Use    Vaping status: Never Used   Substance and Sexual Activity    Alcohol use: Yes     Comment: Weekly Wine 5 drinks;     Drug use: Never    Sexual activity: Yes   Other Topics Concern    Caffeine Concern Yes     Comment: Coffee, 3 cups daily;     Grew up on a farm No    History of tanning Yes    Outdoor occupation No    Pt has a pacemaker No    Pt has a defibrillator No    Breast feeding No    Reaction to local anesthetic No       Family History   Problem Relation Age of Onset    Breast Cancer Self 54    Squamous Cell Self 49        arm    Breast Cancer Mother 72    Other (possible hx of skin cancer) Brother     Colon Cancer Maternal Grandmother 67        Rectal cancer    Prostate Cancer Maternal Grandfather 78    Stroke Maternal Grandfather         CVA    Hypertension Paternal Grandmother     Neurological Disorder  Paternal Grandmother         Alzheimer's disease    Heart Disease Paternal Grandfather         CAD    Uterine Cancer Maternal Aunt 70    Cancer Paternal Uncle         unknown type    Colon Cancer Maternal Great-Grandmother 20        in her 20's and  of disease         PHYSICAL EXAM:    /76 (BP Location: Left arm, Patient Position: Sitting, Cuff Size: large)   Pulse 66   Temp 98.3 °F (36.8 °C) (Oral)   Resp 18   Ht 1.676 m (5' 6\")   Wt 82.6 kg (182 lb)   LMP 2016   SpO2 97%   BMI 29.38 kg/m²   Wt Readings from Last 6 Encounters:   10/23/24 82.6 kg (182 lb)   04/10/24 83.9 kg (185 lb)   10/26/23 83.4 kg (183 lb 12.8 oz)   10/19/23 84 kg (185 lb 3.2 oz)   10/05/23 83 kg (183 lb)   23 82.2 kg (181 lb 3.2 oz)     Physical Exam  General: Patient is alert, not in acute distress.  HEENT: EOMs intact. PERRL.   Neck: No JVD. No palpable lymphadenopathy. Neck is supple.  Chest: Clear to auscultation.     Breasts:  R breast lumpectomy scar.  No breast masses on either breast.  Heart: Regular rate and rhythm.   Abdomen: Soft, non tender with good bowel sounds.  Extremities: No edema.  Neurological: Grossly intact.   Lymphatics: There is no palpable lymphadenopathy throughout in the cervical, supraclavicular, axillary, or inguinal regions.  Psych/Depression: nl        ASSESSMENT/PLAN:     Encounter Diagnoses   Name Primary?    Breast neoplasm, Tis (DCIS), right Yes    Encounter for monitoring aromatase inhibitor therapy        DCIS of the right breast ER 5% AL 2%.     --Discussed with the patient that prognosis is excellent and that main goal of treatment is for local control.     --She completed local control with surgery with lumpectomy.       --She has completed RT     --On anastrozole for 5 years to complete treatment in 2025.     --AI hot flashes: On venlafaxine.    --Mammogram in Oct 2024, BIRADS-2, next due Oct of 2025.     --Will follow up in 6 months.     --Baseline DEXA was normal it  was done March of 2024.  Will have a repeat to assess her bone health in March of 2026.        MDM moderate.    No orders of the defined types were placed in this encounter.      Results From Past 48 Hours:  No results found for this or any previous visit (from the past 48 hours).      Imaging & Referrals:  None   No orders of the defined types were placed in this encounter.    PROCEDURE: [facility] [place] 2D+3D SCREENING BILAT ([id number])     COMPARISON: [facility], [facility] [place] 2D+3D DIAGNOSTIC KEIRA BILAT ([id number]), 9/15/2021, 11:44 AM.  [facility], KEIRA MALLORIE 2D+3D DIAGNOSTIC KEIRA RIGHT ([id number]), 3/10/2022, 12:51 PM.  [facility], KEIRA MALL[place]IE 2D+3D SCREENING BILAT ([id number]), 9/15/2022, 1:46 PM.  [facility], KEIRA MALL[place]IE 2D+3D SCREENING BILAT ([id number]), 9/27/2023, 3:39 PM.     INDICATIONS: Z12.31 Screening mammogram for breast cancer     TECHNIQUE: Full field direct screening mammography was performed and images were reviewed with the Loopcam SANDY 1.5.1.5 CAD device.  3D tomosynthesis was performed and reviewed        BREAST COMPOSITION:   Category b-Scattered areas fibroglandular density.        FINDINGS: There is a biopsy clip in the left breast.  There is no suspicious asymmetry, mass, architectural distortion, or microcalcifications identified in either breast.                    Impression   CONCLUSION:   There is no mammographic evidence of malignancy in either breast. As long as patient's clinical breast exam remains unchanged, annual screening mammogram is recommended.     BI-RADS CATEGORY:    DIAGNOSTIC CATEGORY 2 - BENIGN FINDING:       RECOMMENDATIONS:  ROUTINE MAMMOGRAM AND CLINICAL EVALUATION IN 12 MONTHS.                   PLEASE NOTE: NORMAL MAMMOGRAM DOES NOT EXCLUDE THE POSSIBILITY OF BREAST CANCER.  A CLINICALLY SUSPICIOUS PALPABLE LUMP SHOULD BE BIOPSIED.       For patients over the age of 40, the  target due date for the patient's next mammogram has been entered into a reminder system.       Patient received a discharge summary from the technologist after completion of exam.     Breast marker legend used on images    Triangle = Palpable lump  [place] = Skin tag or mole  BB = Nipple  Linear david = Scar  Square = Pain           Dictated by (CST): Janel Archibald DO on 10/03/2024 at 9:19 AM      Finalized by (CST): Janel Archibald DO on 10/03/2024 at 9:21 AM

## 2025-03-13 ENCOUNTER — LAB ENCOUNTER (OUTPATIENT)
Dept: LAB | Age: 60
End: 2025-03-13
Attending: INTERNAL MEDICINE
Payer: COMMERCIAL

## 2025-03-13 ENCOUNTER — OFFICE VISIT (OUTPATIENT)
Dept: INTERNAL MEDICINE CLINIC | Facility: CLINIC | Age: 60
End: 2025-03-13
Payer: COMMERCIAL

## 2025-03-13 VITALS
TEMPERATURE: 98 F | HEIGHT: 66 IN | HEART RATE: 67 BPM | DIASTOLIC BLOOD PRESSURE: 80 MMHG | BODY MASS INDEX: 29.73 KG/M2 | WEIGHT: 185 LBS | SYSTOLIC BLOOD PRESSURE: 136 MMHG

## 2025-03-13 DIAGNOSIS — Z00.00 PHYSICAL EXAM: Primary | ICD-10-CM

## 2025-03-13 LAB
ALBUMIN SERPL-MCNC: 4.6 G/DL (ref 3.2–4.8)
ALBUMIN/GLOB SERPL: 1.8 {RATIO} (ref 1–2)
ALP LIVER SERPL-CCNC: 71 U/L
ALT SERPL-CCNC: 21 U/L
ANION GAP SERPL CALC-SCNC: 9 MMOL/L (ref 0–18)
AST SERPL-CCNC: 22 U/L (ref ?–34)
BASOPHILS # BLD AUTO: 0.02 X10(3) UL (ref 0–0.2)
BASOPHILS NFR BLD AUTO: 0.4 %
BILIRUB SERPL-MCNC: 0.5 MG/DL (ref 0.3–1.2)
BILIRUB UR QL: NEGATIVE
BUN BLD-MCNC: 14 MG/DL (ref 9–23)
BUN/CREAT SERPL: 15.7 (ref 10–20)
CALCIUM BLD-MCNC: 9 MG/DL (ref 8.7–10.4)
CHLORIDE SERPL-SCNC: 106 MMOL/L (ref 98–112)
CHOLEST SERPL-MCNC: 243 MG/DL (ref ?–200)
CLARITY UR: CLEAR
CO2 SERPL-SCNC: 27 MMOL/L (ref 21–32)
COLOR UR: YELLOW
CREAT BLD-MCNC: 0.89 MG/DL
DEPRECATED RDW RBC AUTO: 42.8 FL (ref 35.1–46.3)
EGFRCR SERPLBLD CKD-EPI 2021: 75 ML/MIN/1.73M2 (ref 60–?)
EOSINOPHIL # BLD AUTO: 0.18 X10(3) UL (ref 0–0.7)
EOSINOPHIL NFR BLD AUTO: 3.2 %
ERYTHROCYTE [DISTWIDTH] IN BLOOD BY AUTOMATED COUNT: 13 % (ref 11–15)
EST. AVERAGE GLUCOSE BLD GHB EST-MCNC: 120 MG/DL (ref 68–126)
FASTING PATIENT LIPID ANSWER: YES
FASTING STATUS PATIENT QL REPORTED: YES
GLOBULIN PLAS-MCNC: 2.5 G/DL (ref 2–3.5)
GLUCOSE BLD-MCNC: 91 MG/DL (ref 70–99)
GLUCOSE UR-MCNC: NEGATIVE MG/DL
HBA1C MFR BLD: 5.8 % (ref ?–5.7)
HCT VFR BLD AUTO: 39.6 %
HDLC SERPL-MCNC: 61 MG/DL (ref 40–59)
HGB BLD-MCNC: 13.5 G/DL
IMM GRANULOCYTES # BLD AUTO: 0.03 X10(3) UL (ref 0–1)
IMM GRANULOCYTES NFR BLD: 0.5 %
KETONES UR-MCNC: NEGATIVE MG/DL
LDLC SERPL CALC-MCNC: 159 MG/DL (ref ?–100)
LYMPHOCYTES # BLD AUTO: 2.12 X10(3) UL (ref 1–4)
LYMPHOCYTES NFR BLD AUTO: 38.2 %
MCH RBC QN AUTO: 30.5 PG (ref 26–34)
MCHC RBC AUTO-ENTMCNC: 34.1 G/DL (ref 31–37)
MCV RBC AUTO: 89.4 FL
MONOCYTES # BLD AUTO: 0.36 X10(3) UL (ref 0.1–1)
MONOCYTES NFR BLD AUTO: 6.5 %
NEUTROPHILS # BLD AUTO: 2.84 X10 (3) UL (ref 1.5–7.7)
NEUTROPHILS # BLD AUTO: 2.84 X10(3) UL (ref 1.5–7.7)
NEUTROPHILS NFR BLD AUTO: 51.2 %
NITRITE UR QL STRIP.AUTO: NEGATIVE
NONHDLC SERPL-MCNC: 182 MG/DL (ref ?–130)
OSMOLALITY SERPL CALC.SUM OF ELEC: 294 MOSM/KG (ref 275–295)
PH UR: 7 [PH] (ref 5–8)
PLATELET # BLD AUTO: 282 10(3)UL (ref 150–450)
POTASSIUM SERPL-SCNC: 4.4 MMOL/L (ref 3.5–5.1)
PROT SERPL-MCNC: 7.1 G/DL (ref 5.7–8.2)
PROT UR-MCNC: NEGATIVE MG/DL
RBC # BLD AUTO: 4.43 X10(6)UL
SODIUM SERPL-SCNC: 142 MMOL/L (ref 136–145)
SP GR UR STRIP: 1.01 (ref 1–1.03)
T4 FREE SERPL-MCNC: 1.2 NG/DL (ref 0.8–1.7)
TRIGL SERPL-MCNC: 132 MG/DL (ref 30–149)
TSI SER-ACNC: 1.24 UIU/ML (ref 0.55–4.78)
UROBILINOGEN UR STRIP-ACNC: 0.2
VLDLC SERPL CALC-MCNC: 26 MG/DL (ref 0–30)
WBC # BLD AUTO: 5.6 X10(3) UL (ref 4–11)

## 2025-03-13 PROCEDURE — 84443 ASSAY THYROID STIM HORMONE: CPT | Performed by: INTERNAL MEDICINE

## 2025-03-13 PROCEDURE — 85025 COMPLETE CBC W/AUTO DIFF WBC: CPT | Performed by: INTERNAL MEDICINE

## 2025-03-13 PROCEDURE — 80061 LIPID PANEL: CPT | Performed by: INTERNAL MEDICINE

## 2025-03-13 PROCEDURE — 81001 URINALYSIS AUTO W/SCOPE: CPT | Performed by: INTERNAL MEDICINE

## 2025-03-13 PROCEDURE — 81015 MICROSCOPIC EXAM OF URINE: CPT | Performed by: INTERNAL MEDICINE

## 2025-03-13 PROCEDURE — 84439 ASSAY OF FREE THYROXINE: CPT | Performed by: INTERNAL MEDICINE

## 2025-03-13 PROCEDURE — 80053 COMPREHEN METABOLIC PANEL: CPT | Performed by: INTERNAL MEDICINE

## 2025-03-13 PROCEDURE — 99396 PREV VISIT EST AGE 40-64: CPT | Performed by: INTERNAL MEDICINE

## 2025-03-13 PROCEDURE — 36415 COLL VENOUS BLD VENIPUNCTURE: CPT | Performed by: INTERNAL MEDICINE

## 2025-03-13 PROCEDURE — 83036 HEMOGLOBIN GLYCOSYLATED A1C: CPT | Performed by: INTERNAL MEDICINE

## 2025-03-31 NOTE — PROGRESS NOTES
HPI:    Patient ID: Barb Baptiste is a 59 year old female.    Numbness  Associated symptoms include numbness. Pertinent negatives include no abdominal pain, chest pain, chills, coughing, fatigue, fever, headaches, joint swelling, nausea, rash, sore throat, vomiting or weakness.       Physical exam  Generally healthy    Overweight    /80 (BP Location: Right arm, Patient Position: Sitting, Cuff Size: adult)   Pulse 67   Temp 98.2 °F (36.8 °C) (Temporal)   Ht 5' 6\" (1.676 m)   Wt 185 lb (83.9 kg)   LMP 12/07/2016   BMI 29.86 kg/m²   Wt Readings from Last 6 Encounters:   03/13/25 185 lb (83.9 kg)   10/23/24 182 lb (82.6 kg)   04/10/24 185 lb (83.9 kg)   10/26/23 183 lb 12.8 oz (83.4 kg)   10/19/23 185 lb 3.2 oz (84 kg)   10/05/23 183 lb (83 kg)     Body mass index is 29.86 kg/m².  HGBA1C:    Lab Results   Component Value Date    A1C 5.8 (H) 03/13/2025    A1C 5.5 02/03/2023    A1C 5.6 04/05/2021     03/13/2025         Review of Systems   Constitutional:  Negative for activity change, chills, fatigue and fever.   HENT:  Negative for ear discharge, nosebleeds, postnasal drip, rhinorrhea, sinus pressure and sore throat.    Eyes:  Negative for pain, discharge and redness.   Respiratory:  Negative for cough, chest tightness, shortness of breath and wheezing.    Cardiovascular:  Negative for chest pain, palpitations and leg swelling.   Gastrointestinal:  Negative for abdominal pain, blood in stool, constipation, diarrhea, nausea and vomiting.   Genitourinary:  Negative for difficulty urinating, dysuria, frequency, hematuria and urgency.   Musculoskeletal:  Negative for back pain, gait problem and joint swelling.   Skin:  Negative for rash.   Neurological:  Positive for numbness. Negative for syncope, weakness, light-headedness and headaches.   Psychiatric/Behavioral:  Negative for dysphoric mood. The patient is not nervous/anxious.          Current Outpatient Medications   Medication Sig Dispense Refill     ANASTROZOLE 1 MG Oral Tab tab TAKE 1 TABLET BY MOUTH EVERY DAY 90 tablet 3    venlafaxine ER 37.5 MG Oral Capsule SR 24 Hr Take 1 capsule (37.5 mg total) by mouth daily. 90 capsule 3    fexofenadine 180 MG Oral Tab       KRILL OIL OR Take by mouth daily.       Allergies:Allergies[1]    HISTORY:  Past Medical History:    Actinic keratosis    Left arm-Focal actinic keratosis with perifollicular fibrosis suggestive of ruptured cyst    Anemia, iron deficiency    Atypical nevus    skin of left medial ankle    Breast cancer (HCC)    right breast    Breast neoplasm, Tis (DCIS), right    Gastritis    History of dysplastic nevus    Internal hemorrhoids    Lichenoid actinic keratosis    Central chest near sternal notch    Ocular migraine    OS    Osteoarthritis of first metatarsophalangeal (MTP) joint of right foot    SCCA (squamous cell carcinoma) of skin    2015    Seasonal allergies      Past Surgical History:   Procedure Laterality Date    Colonoscopy  2010    Colonoscopy N/A 01/30/2018    Procedure: COLONOSCOPY;  Surgeon: Mile Quintanilla MD;  Location: [facility]    Hc surgery catheter eps dx/ablation other than 3d c1733      hand surgery    Lumpectomy right      [place] biopsy stereo nodule 1 site left (cpt=19081)  12/03/2019    calcs    Chula biopsy stereo nodule 1 site right (cpt=19081)  12/03/2019    calcification    Chula biopsy stereo nodule 1 site right (cpt=19081)  12/13/2019    calcs    Radiation right      02/ & 03/2020    Tonsillectomy      Upper gi endoscopy,biopsy  2010    EGD with biopsy      Family History   Problem Relation Age of Onset    Breast Cancer Self 54    Squamous Cell Self 49        arm    Breast Cancer Mother 72    Other (possible hx of skin cancer) Brother     Colon Cancer Maternal Grandmother 67        Rectal cancer    Prostate Cancer Maternal Grandfather 78    Stroke Maternal Grandfather         CVA    Hypertension Paternal Grandmother     Neurological Disorder Paternal Grandmother          Alzheimer's disease    Heart Disease Paternal Grandfather         CAD    Uterine Cancer Maternal Aunt 70    Cancer Paternal Uncle         unknown type    Colon Cancer Maternal Great-Grandmother 20        in her 20's and  of disease      Social History:   Social History     Socioeconomic History    Marital status:    Tobacco Use    Smoking status: Never     Passive exposure: Never    Smokeless tobacco: Never   Vaping Use    Vaping status: Never Used   Substance and Sexual Activity    Alcohol use: Yes     Comment: Weekly Wine 5 drinks;     Drug use: Never    Sexual activity: Yes   Other Topics Concern    Caffeine Concern Yes     Comment: Coffee, 3 cups daily;     Grew up on a farm No    History of tanning Yes    Outdoor occupation No    Pt has a pacemaker No    Pt has a defibrillator No    Breast feeding No    Reaction to local anesthetic No     Social Drivers of Health     Food Insecurity: No Food Insecurity (3/13/2025)    NCSS - Food Insecurity     Worried About Running Out of Food in the Last Year: No     Ran Out of Food in the Last Year: No   Transportation Needs: No Transportation Needs (3/13/2025)    NCSS - Transportation     Lack of Transportation: No   Housing Stability: Not At Risk (3/13/2025)    NCSS - Housing/Utilities     Has Housing: Yes     Worried About Losing Housing: No     Unable to Get Utilities: No        PHYSICAL EXAM:    Physical Exam  Constitutional:       General: She is not in acute distress.     Appearance: She is well-developed. She is not diaphoretic.   HENT:      Head: Normocephalic and atraumatic.      Right Ear: Ear canal normal.      Left Ear: Ear canal normal.      Nose: Nose normal.      Mouth/Throat:      Pharynx: No oropharyngeal exudate or posterior oropharyngeal erythema.   Eyes:      General: No scleral icterus.        Right eye: No discharge.         Left eye: No discharge.      Conjunctiva/sclera: Conjunctivae normal.      Pupils: Pupils are equal, round, and  reactive to light.   Cardiovascular:      Rate and Rhythm: Normal rate and regular rhythm.      Heart sounds: Normal heart sounds. No murmur heard.  Pulmonary:      Effort: Pulmonary effort is normal. No respiratory distress.      Breath sounds: Normal breath sounds. No wheezing.   Abdominal:      General: Bowel sounds are normal.      Palpations: Abdomen is soft. There is no mass.      Tenderness: There is no abdominal tenderness. There is no guarding or rebound.      Hernia: No hernia is present.   Musculoskeletal:         General: No tenderness.   Skin:     General: Skin is warm and dry.      Findings: No lesion or rash.   Neurological:      Mental Status: She is alert.      Gait: Gait normal.   Psychiatric:         Behavior: Behavior normal.         Thought Content: Thought content normal.              ASSESSMENT/PLAN:   (Z00.00) Physical exam  (primary encounter diagnosis)  Plan: CBC With Differential With Platelet, Comp         Metabolic Panel (14), Lipid Panel, Hemoglobin         A1C, TSH and Free T4, Urinalysis, Routine, UA         Microscopic only, urine       Colonsocopy mammogram and gyne exam advised  Generally healthy  Body mass index is 29.86 kg/m².  Weight loss advised   limit overall caloric intake  Low diet  limit carbohydrate intake   increase activity  as tolerated  exercise  Patient voiced understanding  and agrees with plan         Orders Placed This Encounter   Procedures    CBC With Differential With Platelet    Comp Metabolic Panel (14)    Lipid Panel    Hemoglobin A1C    TSH and Free T4    Urinalysis, Routine    UA Microscopic only, urine       Meds This Visit:  Requested Prescriptions      No prescriptions requested or ordered in this encounter       Imaging & Referrals:  None        ID#[id number]           [1]   Allergies  Allergen Reactions    [place] HIVES     Adhesive Glue - rash

## 2025-04-23 ENCOUNTER — OFFICE VISIT (OUTPATIENT)
Age: 60
End: 2025-04-23
Attending: INTERNAL MEDICINE
Payer: COMMERCIAL

## 2025-04-23 VITALS
SYSTOLIC BLOOD PRESSURE: 131 MMHG | HEIGHT: 66 IN | BODY MASS INDEX: 29.57 KG/M2 | HEART RATE: 70 BPM | RESPIRATION RATE: 18 BRPM | DIASTOLIC BLOOD PRESSURE: 83 MMHG | WEIGHT: 184 LBS | TEMPERATURE: 98 F | OXYGEN SATURATION: 97 %

## 2025-04-23 DIAGNOSIS — R23.2 HOT FLASHES RELATED TO AROMATASE INHIBITOR THERAPY: ICD-10-CM

## 2025-04-23 DIAGNOSIS — D05.11 BREAST NEOPLASM, TIS (DCIS), RIGHT: Primary | ICD-10-CM

## 2025-04-23 DIAGNOSIS — Z79.811 ENCOUNTER FOR MONITORING AROMATASE INHIBITOR THERAPY: ICD-10-CM

## 2025-04-23 DIAGNOSIS — Z78.0 POST-MENOPAUSAL: ICD-10-CM

## 2025-04-23 DIAGNOSIS — Z86.000 ENCOUNTER FOR FOLLOW-UP SURVEILLANCE OF DUCTAL CARCINOMA IN SITU (DCIS) OF BREAST: ICD-10-CM

## 2025-04-23 DIAGNOSIS — T45.1X5A HOT FLASHES RELATED TO AROMATASE INHIBITOR THERAPY: ICD-10-CM

## 2025-04-23 DIAGNOSIS — Z12.31 SCREENING MAMMOGRAM FOR BREAST CANCER: ICD-10-CM

## 2025-04-23 DIAGNOSIS — Z08 ENCOUNTER FOR FOLLOW-UP SURVEILLANCE OF DUCTAL CARCINOMA IN SITU (DCIS) OF BREAST: ICD-10-CM

## 2025-04-23 DIAGNOSIS — Z51.81 ENCOUNTER FOR MONITORING AROMATASE INHIBITOR THERAPY: ICD-10-CM

## 2025-04-23 NOTE — PROGRESS NOTES
HPI   Barb Baptiste is a 59 year old female here for f/u of   Encounter Diagnoses   Name Primary?    Breast neoplasm, Tis (DCIS), right Yes    Encounter for monitoring aromatase inhibitor therapy     Encounter for follow-up surveillance of ductal carcinoma in situ (DCIS) of breast     Screening mammogram for breast cancer     Hot flashes related to aromatase inhibitor therapy     Post-menopausal        Compliance with SBE: Yes. Changes on SBE: No.  R breast tender at times at scar, improved.    Compliance with Anastrozole:  compliance all of the time  Side effects from anastrozole: Yes hot flashes, rare, Yes arthralgias or myalgias had been a little worse, states from walking more.  Other:  No  Hot flashes improved with venlafaxine.    Migraines resolved.    ECOG PS: 0      Review of Systems:     Review of Systems   Constitutional:  Negative for appetite change, fatigue (a little sleepy lately.) and unexpected weight change.   Respiratory:  Negative for cough and shortness of breath.    Cardiovascular:  Negative for chest pain.   Gastrointestinal:  Negative for abdominal pain.   Endocrine: Positive for hot flashes (not as frequent and not as intense).   Genitourinary:  Negative for dysuria and frequency.    Musculoskeletal:  Positive for arthralgias (in am). Negative for back pain and neck pain.        No bone pains   Neurological:  Negative for dizziness and headaches.   Hematological:  Negative for adenopathy.   Psychiatric/Behavioral:  Negative for sleep disturbance.          Current Outpatient Medications   Medication Sig Dispense Refill    Cetirizine HCl (ZYRTEC ALLERGY OR)       ANASTROZOLE 1 MG Oral Tab tab TAKE 1 TABLET BY MOUTH EVERY DAY 90 tablet 3    venlafaxine ER 37.5 MG Oral Capsule SR 24 Hr Take 1 capsule (37.5 mg total) by mouth daily. 90 capsule 3    KRILL OIL OR Take by mouth in the morning.       Allergies:   Allergies   Allergen Reactions    [place] HIVES     Adhesive Glue - rash         Past Medical History:    Actinic keratosis    Left arm-Focal actinic keratosis with perifollicular fibrosis suggestive of ruptured cyst    Anemia, iron deficiency    Atypical nevus    skin of left medial ankle    Breast cancer (HCC)    right breast    Breast neoplasm, Tis (DCIS), right    Gastritis    History of dysplastic nevus    Internal hemorrhoids    Lichenoid actinic keratosis    Central chest near sternal notch    Ocular migraine    OS    Osteoarthritis of first metatarsophalangeal (MTP) joint of right foot    SCCA (squamous cell carcinoma) of skin    2015    Seasonal allergies     Past Surgical History:   Procedure Laterality Date    Colonoscopy  2010    Colonoscopy N/A 01/30/2018    Procedure: COLONOSCOPY;  Surgeon: Mile Quintanilla MD;  Location: [facility]    Hc surgery catheter eps dx/ablation other than 3d c1733      hand surgery    Lumpectomy right      Ch[place] biopsy stereo nodule 1 site left (cpt=19081)  12/03/2019    calcs    Chula biopsy stereo nodule 1 site right (cpt=19081)  12/03/2019    calcification    Chula biopsy stereo nodule 1 site right (cpt=19081)  12/13/2019    calcs    Radiation right      02/ & 03/2020    Tonsillectomy      Upper gi endoscopy,biopsy  2010    EGD with biopsy     Social History     Socioeconomic History    Marital status:    Tobacco Use    Smoking status: Never     Passive exposure: Never    Smokeless tobacco: Never   Vaping Use    Vaping status: Never Used   Substance and Sexual Activity    Alcohol use: Yes     Comment: Weekly Wine 5 drinks;     Drug use: Never    Sexual activity: Yes   Other Topics Concern    Caffeine Concern Yes     Comment: Coffee, 3 cups daily;     Grew up on a farm No    History of tanning Yes    Outdoor occupation No    Pt has a pacemaker No    Pt has a defibrillator No    Breast feeding No    Reaction to local anesthetic No     Social Drivers of Health     Food Insecurity: No Food Insecurity (3/13/2025)    NCSS - Food Insecurity      Worried About Running Out of Food in the Last Year: No     Ran Out of Food in the Last Year: No   Transportation Needs: No Transportation Needs (3/13/2025)    NCSS - Transportation     Lack of Transportation: No   Housing Stability: Not At Risk (3/13/2025)    NCSS - Housing/Utilities     Has Housing: Yes     Worried About Losing Housing: No     Unable to Get Utilities: No       Family History   Problem Relation Age of Onset    Breast Cancer Self 54    Squamous Cell Self 49        arm    Breast Cancer Mother 72    Other (possible hx of skin cancer) Brother     Colon Cancer Maternal Grandmother 67        Rectal cancer    Prostate Cancer Maternal Grandfather 78    Stroke Maternal Grandfather         CVA    Hypertension Paternal Grandmother     Neurological Disorder Paternal Grandmother         Alzheimer's disease    Heart Disease Paternal Grandfather         CAD    Uterine Cancer Maternal Aunt 70    Cancer Paternal Uncle         unknown type    Colon Cancer Maternal Great-Grandmother 20        in her 20's and  of disease         PHYSICAL EXAM:    /83 (BP Location: Left arm, Patient Position: Sitting, Cuff Size: large)   Pulse 70   Temp 98.4 °F (36.9 °C) (Tympanic)   Resp 18   Ht 1.676 m (5' 6\")   Wt 83.5 kg (184 lb)   LMP 2016   SpO2 97%   BMI 29.70 kg/m²   Wt Readings from Last 6 Encounters:   25 83.5 kg (184 lb)   25 83.9 kg (185 lb)   10/23/24 82.6 kg (182 lb)   04/10/24 83.9 kg (185 lb)   10/26/23 83.4 kg (183 lb 12.8 oz)   10/19/23 84 kg (185 lb 3.2 oz)     Physical Exam  General: Patient is alert, not in acute distress.  HEENT: EOMs intact. PERRL.   Neck: No JVD. No palpable lymphadenopathy. Neck is supple.  Chest: Clear to auscultation.     Breasts:  R breast lumpectomy scar.  No breast masses on either breast.  Heart: Regular rate and rhythm.   Abdomen: Soft, non tender with good bowel sounds.  Extremities: No edema.  Neurological: Grossly intact.   Lymphatics: There is  no palpable lymphadenopathy throughout in the cervical, supraclavicular, axillary, or inguinal regions.  Psych/Depression: nl        ASSESSMENT/PLAN:     Encounter Diagnoses   Name Primary?    Breast neoplasm, Tis (DCIS), right Yes    Encounter for monitoring aromatase inhibitor therapy     Encounter for follow-up surveillance of ductal carcinoma in situ (DCIS) of breast     Screening mammogram for breast cancer     Hot flashes related to aromatase inhibitor therapy     Post-menopausal        DCIS of the right breast ER 5% OR 2%.     --Discussed with the patient that prognosis is excellent and that main goal of treatment is for local control.     --She completed local control with surgery with lumpectomy.       --She has completed RT     --On anastrozole for 5 years to complete treatment in April 2025.   Discontinued.      --AI hot flashes: On venlafaxine.  Will discontinue as will discontinue AI.  If hot flashes again, will recommend to resume.      --Mammogram in Oct 2024, BIRADS-2, next due Oct of 2025.     --Will follow up in 6 months with mammogram and then yearly.     --Baseline DEXA was normal it was done March of 2024.  Will have a repeat to assess her bone health in March of 2026.        MDM moderate.    No orders of the defined types were placed in this encounter.      Results From Past 48 Hours:  No results found for this or any previous visit (from the past 48 hours).      Imaging & Referrals:  None   No orders of the defined types were placed in this encounter.

## (undated) DEVICE — SNARE CAPTIFLEX MICRO-OVL OLY

## (undated) DEVICE — SUTURE SILK 2-0 FS

## (undated) DEVICE — CLIP MED INTNL HMCLP TNTLM

## (undated) DEVICE — TRAP MCS 40ML 5IN PLS SCR CAP

## (undated) DEVICE — CONTAINER SPEC STR 4OZ GRY LID

## (undated) DEVICE — CAUTERY BLADE 2IN INS E1455

## (undated) DEVICE — 6 ML SYRINGE LUER-LOCK TIP: Brand: MONOJECT

## (undated) DEVICE — 3M™ STERI-STRIP™ REINFORCED ADHESIVE SKIN CLOSURES, R1547, 1/2 IN X 4 IN (12 MM X 100 MM), 6 STRIPS/ENVELOPE: Brand: 3M™ STERI-STRIP™

## (undated) DEVICE — VEST SRG 3 MED CUP R/L

## (undated) DEVICE — Device: Brand: DEFENDO AIR/WATER/SUCTION AND BIOPSY VALVE

## (undated) DEVICE — FLEXIBLE YANKAUER,MEDIUM TIP, NO VACUUM CONTROL: Brand: ARGYLE

## (undated) DEVICE — STANDARD HYPODERMIC NEEDLE,POLYPROPYLENE HUB: Brand: MONOJECT

## (undated) DEVICE — 12 ML SYRINGE LUER-LOCK TIP: Brand: MONOJECT

## (undated) DEVICE — SUTURE PDS II 3-0 Z683G

## (undated) DEVICE — ENCORE® PERRY STYLE 42 PF SIZE 6.5, STERILE LATEX POWDER-FREE SURGICAL GLOVE: Brand: ENCORE

## (undated) DEVICE — GAUZE SPONGES,12 PLY: Brand: CURITY

## (undated) DEVICE — SUTURE MONOCRYL 4-0 PS-2

## (undated) DEVICE — DRAPE PACK CHEST & U BAR

## (undated) DEVICE — DRAPE TAPE: Brand: CONVERTORS

## (undated) DEVICE — ENDOSCOPY PACK - LOWER: Brand: MEDLINE INDUSTRIES, INC.

## (undated) DEVICE — ADHESIVE MASTISOL 2/3CC VL

## (undated) DEVICE — CASED DISP BIPOLAR CORD

## (undated) DEVICE — CLIP SM INTNL HMCLP TNTLM ESCP

## (undated) DEVICE — SOL  .9 1000ML BTL

## (undated) DEVICE — SUTURE VICRYL 3-0 SH

## (undated) DEVICE — MINOR GENERAL: Brand: MEDLINE INDUSTRIES, INC.

## (undated) NOTE — MR AVS SNAPSHOT
[facility] [place] [address] [address]  [id number] [phone]               Thank you for choosing us for your health care visit with Libia Arguello MD.  We are glad to serve you and happy to provide you with this summary o authorization numbers or be assured that none are required. You can then schedule your appointment. Failure to obtain required authorization numbers can create reimbursement difficulties for you. Assoc Dx:   Tear duct infection, right [M85.704] Visit [facility] online at  [email or web address]

## (undated) NOTE — LETTER
November 18, 2019     Harshal Coker  [address]  [place]      Dear Delila Mortimer:    Below are the results from your recent visit:    HPV negative     Resulted Orders   HPV HIGH RISK , THIN PREP COLLECTION   Result Value Ref Range    HPV High Ris

## (undated) NOTE — LETTER
March 18, 2025     Barb Baptiste  [address]  [facility] [address]      Dear Barb:    Below are the results from your recent visit:  Urinalysis within normal   PREDIABETES     HEALTHY DIET   LIMIT CARBOHYDRATE INTAKE   AVOID SWEETS   AVOID WHITES  POTATOES  RICE BREAD ETC   EXERCISE REGULARLY AS TOLERATED   MAINTAIN HEALTHY WEIGHT       LIPID PANEL   LDL IS ELEVATED - BAD CHOLESTEROL   LOW CHOLESTEROL DIET ADVISED   AVOID SATURATED AND TRANS FATS   EXERCISE REGULARLY AS TOLERATED       Resulted Orders   CBC With Differential With Platelet   Result Value Ref Range    WBC 5.6 4.0 - 11.0 x10(3) uL    RBC 4.43 3.80 - 5.30 x10(6)uL    HGB 13.5 12.0 - 16.0 g/dL    HCT 39.6 35.0 - 48.0 %    MCV 89.4 80.0 - 100.0 fL    MCH 30.5 26.0 - 34.0 pg    MCHC 34.1 31.0 - 37.0 g/dL    RDW-SD 42.8 35.1 - 46.3 fL    RDW 13.0 11.0 - 15.0 %    .0 150.0 - 450.0 10(3)uL    Neutrophil Absolute Prelim 2.84 1.50 - 7.70 x10 (3) uL    Neutrophil Absolute 2.84 1.50 - 7.70 x10(3) uL    Lymphocyte Absolute 2.12 1.00 - 4.00 x10(3) uL    Monocyte Absolute 0.36 0.10 - 1.00 x10(3) uL    Eosinophil Absolute 0.18 0.00 - 0.70 x10(3) uL    Basophil Absolute 0.02 0.00 - 0.20 x10(3) uL    Immature Granulocyte Absolute 0.03 0.00 - 1.00 x10(3) uL    Neutrophil % 51.2 %    Lymphocyte % 38.2 %    Monocyte % 6.5 %    Eosinophil % 3.2 %    Basophil % 0.4 %    Immature Granulocyte % 0.5 %   Comp Metabolic Panel (14)   Result Value Ref Range    Glucose 91 70 - 99 mg/dL    Sodium 142 136 - 145 mmol/L    Potassium 4.4 3.5 - 5.1 mmol/L    Chloride 106 98 - 112 mmol/L    CO2 27.0 21.0 - 32.0 mmol/L    Anion Gap 9 0 - 18 mmol/L    BUN 14 9 - 23 mg/dL    Creatinine 0.89 0.55 - 1.02 mg/dL    BUN/CREA Ratio 15.7 10.0 - 20.0    Calcium, Total 9.0 8.7 - 10.4 mg/dL    Calculated Osmolality 294 275 - 295 mOsm/kg    eGFR-Cr 75 >=60 mL/min/1.73m2    ALT 21 10 - 49 U/L    AST 22 <34 U/L    Alkaline Phosphatase 71 46 - 118 U/L    Bilirubin, Total 0.5 0.3 - 1.2  mg/dL    Total Protein 7.1 5.7 - 8.2 g/dL    Albumin 4.6 3.2 - 4.8 g/dL    Globulin  2.5 2.0 - 3.5 g/dL    A/G Ratio 1.8 1.0 - 2.0    Patient Fasting for CMP? Yes    Lipid Panel   Result Value Ref Range    Cholesterol, Total 243 (H) <200 mg/dL    HDL Cholesterol 61 (H) 40 - 59 mg/dL    Triglycerides 132 30 - 149 mg/dL    LDL Cholesterol 159 (H) <100 mg/dL    VLDL 26 0 - 30 mg/dL    Non HDL Chol 182 (H) <130 mg/dL    Patient Fasting for Lipid? Yes    Hemoglobin A1C   Result Value Ref Range    HgbA1C 5.8 (H) <5.7 %    Estimated Average Glucose 120 68 - 126 mg/dL   TSH and Free T4   Result Value Ref Range    Free T4 1.2 0.8 - 1.7 ng/dL    TSH 1.240 0.550 - 4.780 uIU/mL   Urinalysis, Routine   Result Value Ref Range    Urine Color Yellow Yellow    Clarity Urine Clear Clear    Spec Gravity 1.010 1.005 - 1.030    Glucose Urine Negative Negative mg/dL    Bilirubin Urine Negative Negative    Ketones Urine Negative Negative mg/dL    Blood Urine Small (A) Negative    pH Urine 7.0 5.0 - 8.0    Protein Urine Negative Negative mg/dL    Urobilinogen Urine 0.2 0.2    Nitrite Urine Negative Negative    Leukocyte Esterase Urine Trace (A) Negative   UA Microscopic only, urine   Result Value Ref Range    WBC Urine 1-5 0 - 5 /HPF    RBC Urine 0-2 0 - 2 /HPF    Bacteria Urine None Seen None Seen /HPF    Squamous Epi. Cells Few (A) None Seen /HPF    Renal Tubular Epithelial Cells None Seen None Seen /HPF    Transitional Cells None Seen None Seen /HPF    Yeast Urine None Seen None Seen /HPF       If you have any questions or concerns, please don't hesitate to call.

## (undated) NOTE — LETTER
10/10/2019              Anna Sim  [facility]         Dear Ld Louise,      The report of the Biopsy done on 10/7/19 shows a irritated intradermal nevus (mole).   This is a benign (not cancerous) growth, and requires no f

## (undated) NOTE — LETTER
October 27, 2017     Ginger Booth  [address] [place] [address] [address] [address]      Dear Janell Murray:    Below are the results from your recent visit:  Pap smear neg   HPV negative   Resulted Orders   HPV HIGH RISK , THIN PREP COLLECTION   Result Value Ref Range    H Lindsay Barron                                                             First Screen:          Lorena Brown                                                           Rescreen:              Martell Quezada

## (undated) NOTE — LETTER
November 20, 2019     [place] [address] [place]      Dear Yu Solorio:    Below are the results from your recent visit:    Pap neg   Hpv neg    Resulted Orders   HPV HIGH RISK , THIN PREP COLLECTION   Result Value Ref Range    HPV First Screen:          Josefina Jackson                                                               Specimen:     ThinPrep Imager Screening Pap, Cervical/endocervical                                          If you have any questions or concerns, please don

## (undated) NOTE — MR AVS SNAPSHOT
[facility] [place] [address] [address] [address] [phone]               Thank you for choosing us for your health care visit with Bird Bach MD.  We are glad to serve you and happy to provide you with this summary of y Commonly known as:  MAXZIDE-25           ZYLET 0.5-0.3 % Susp   Generic drug:  Loteprednol-Tobramycin   INSTILL 1 DROP INTO RIGHT EYE QID UTD                   Anirudh     Sign up for [employer], your secure online medical record.   [employer] will allow you to a HOW TO GET STARTED: HOW TO STAY MOTIVATED:   Start activities slowly and build up over time Do what you like   Get your heart pumping – brisk walking, biking, swimming Even 10 minute increments are effective and add up over the week   2 ½ hours per week –

## (undated) NOTE — LETTER
12/5/2022    Barb [address]            Dear Shannon Morton,      Our records indicate that you are due for an appointment for a Colonoscopy with a physician at [address] [place] [address],[address] - Gastroenterology. Our doctors are booking out about 3-5 months for procedures. Please call our office to schedule a phone screening appointment to plan for the procedure(s). Your medical well-being is important to us. If your insurance requires a referral, please call your primary care office to request one.       Thank you,      [facility] Phys[facility] and [facility]

## (undated) NOTE — LETTER
[address], [place], [place]       AUTHORIZATION FOR SURGICAL OPERATION OR PROCEDURE    1.  I hereby authorize Dr. Farshad Lake MD,  my Physician(s) and whomever may be designated as the doctor's Assistant, to perform donor transfusion, I will discuss this with my         Physician. 5.   I consent to the photographing of procedure(s) to be performed for the purposes of advancing medicine, science         and/or education, provided my identity is not revealed.  If the pr (Relationship to Patient)      _______________________________________________________________ ____________________________  (Witness signature)                                                                                                  (Date)

## (undated) NOTE — LETTER
[facility]  Administration of Anesthesia  1. Laura Frank, or _________________________________ acting on her behalf, (Patient) (Dependent/Representative) request to receive anesthesia for my pending procedure/operation/treatment.   YUSEF p infections, high spinal block, spinal bleeding, seizure, cardiac arrest and death. 7. AWARENESS: I understand that it is possible (but unlikely) to have explicit memory of events from the operating room while under general anesthesia.   8. ELECTROCONVULSIV (Date) (Time)                                                                                               (Responsible person in case of minor/ unconscious pt) /Relationship    My signature below affirms that prior to the time of the procedure, I have ex

## (undated) NOTE — LETTER
April 7, 2021     Eliu [address] [place]      Dear Mini Bracket:    Below are the results from your recent visit:  NORMAL PFT  SEND COPY AND LETTER   LIPID PANEL   LDL IS ELEVATED    BAD CHOLESTEROL   LOW CHOLESTEROL DIET ADVISE HgbA1C 5.6 <5.7 %    Estimated Average Glucose 114 68 - 126 mg/dL         If you have any questions or concerns, please don't hesitate to call.     Danny Leon MD

## (undated) NOTE — LETTER
[address], [place], [place]       AUTHORIZATION FOR SURGICAL OPERATION OR PROCEDURE    1.  I hereby authorize                              ,  my Physician(s) and whomever may be designated as the doctor's Assistant, to per to have an autologous transfusion of my own blood, or a directed donor transfusion, I will discuss this with my         Physician.   5.   I consent to the photographing of procedure(s) to be performed for the purposes of advancing medicine, science (Responsible person in case of minor or unconscious patient)   (Relationship to Patient)      _______________________________________________________________ ____________________________  (Witness signature)

## (undated) NOTE — ED AVS SNAPSHOT
Edward Calderon   MRN: [id number]    Department:  [facility] Emergency Department   Date of Visit:  3/23/2019           Disclosure     Insurance plans vary and the physician(s) referred by the ER may not be covered by your plan.  Please contact yo CARE PHYSICIAN AT ONCE OR RETURN IMMEDIATELY TO THE EMERGENCY DEPARTMENT. If you have been prescribed any medication(s), please fill your prescription right away and begin taking the medication(s) as directed.   If you believe that any of the medications

## (undated) NOTE — LETTER
[facility], [place]  Authorization for Invasive Procedures  1.  I hereby authorize Dr. Armen Bach*** , my physician and whomever may be designated as the doctor's assistant, to perform the following operation and/or procedure:  Josselin Osman performed for the purposes of advancing medicine, science, and/or education, provided my identity is not revealed. If the procedure has been videotaped, the physician/surgeon will obtain the original videotape.  The hospital will not be responsible for stor My signature below affirms that prior to the time of the procedure, I have explained to the patient and/or her legal representative, the risks and benefits involved in the proposed treatment and any reasonable alternative to the proposed treatment.  I have

## (undated) NOTE — LETTER
December 4, 2019     Juan A [address] [place]      Dear Blaine Bowles:    Below are the results from your recent visit:    No breast Malignancy    Resulted Orders   SURGICAL PATHOLOGY TISSUE   Result Value Ref Range    Case Report Specimen A is labeled \"Emile, cores right outer breast with imaging\" received in formalin. The specimen consists of multiple fragments of fibrofatty tissue measuring in aggregate 3.0 x 2.8 x 0.4 cm.  The specimen is submitted entirely in cassettes A1-A

## (undated) NOTE — LETTER
October 24, 2017     Elo [address]      Dear Kat Earl:    Below are the results from your recent visit:  HPV is negative.     Resulted Orders   HPV HIGH RISK , THIN PREP COLLECTION   Result Value Ref Range    HPV High Ris

## (undated) NOTE — LETTER
[address] [place] [address]  Authorization for Surgical Operation or Procedure  Date: ______________       Time: _______________  1.  I hereby authorize Dr. Tracy Horowitz, my physician and the assistant, to perform the following operation and/o 5. I consent to the photographing of the operations or procedures to be performed for the purposes of advancing medicine, science, and/or education, provided my identity is not revealed.  If the procedure has been videotaped, the physician/surgeon will obta risks and benefits involved in the proposed treatment and any reasonable alternative to the proposed treatment. I have also explained the risks and benefits involved in the refusal of the proposed treatment and have answered the patient's questions.  If I h